# Patient Record
Sex: FEMALE | Race: WHITE | URBAN - NONMETROPOLITAN AREA
[De-identification: names, ages, dates, MRNs, and addresses within clinical notes are randomized per-mention and may not be internally consistent; named-entity substitution may affect disease eponyms.]

---

## 2017-04-20 ENCOUNTER — OFFICE VISIT (OUTPATIENT)
Dept: URGENT CARE | Age: 27
End: 2017-04-20

## 2017-04-20 VITALS
HEIGHT: 67 IN | HEART RATE: 60 BPM | RESPIRATION RATE: 18 BRPM | DIASTOLIC BLOOD PRESSURE: 83 MMHG | WEIGHT: 163 LBS | BODY MASS INDEX: 25.58 KG/M2 | SYSTOLIC BLOOD PRESSURE: 118 MMHG | TEMPERATURE: 97.7 F | OXYGEN SATURATION: 99 %

## 2017-04-20 DIAGNOSIS — H66.92 LEFT OTITIS MEDIA, UNSPECIFIED CHRONICITY, UNSPECIFIED OTITIS MEDIA TYPE: Primary | ICD-10-CM

## 2017-04-20 PROCEDURE — 99213 OFFICE O/P EST LOW 20 MIN: CPT | Performed by: NURSE PRACTITIONER

## 2017-04-20 RX ORDER — AZITHROMYCIN 250 MG/1
TABLET, FILM COATED ORAL
Qty: 1 PACKET | Refills: 0 | Status: SHIPPED | OUTPATIENT
Start: 2017-04-20 | End: 2017-04-30

## 2017-04-20 ASSESSMENT — ENCOUNTER SYMPTOMS
RESPIRATORY NEGATIVE: 1
EYES NEGATIVE: 1
SHORTNESS OF BREATH: 0
TROUBLE SWALLOWING: 0
WHEEZING: 0
EYE REDNESS: 0
SINUS PRESSURE: 0
SORE THROAT: 0
ALLERGIC/IMMUNOLOGIC NEGATIVE: 1

## 2019-07-02 ENCOUNTER — HOSPITAL ENCOUNTER (OUTPATIENT)
Dept: GENERAL RADIOLOGY | Facility: HOSPITAL | Age: 29
Discharge: HOME | End: 2019-07-02
Admitting: PEDIATRICS

## 2019-07-02 ENCOUNTER — TRANSCRIBE ORDERS (OUTPATIENT)
Dept: LAB | Facility: HOSPITAL | Age: 29
End: 2019-07-02

## 2019-07-02 DIAGNOSIS — R07.9 CHEST PAIN, UNSPECIFIED TYPE: Primary | ICD-10-CM

## 2019-07-02 DIAGNOSIS — R07.9 CHEST PAIN, UNSPECIFIED TYPE: ICD-10-CM

## 2019-07-02 PROCEDURE — 71046 X-RAY EXAM CHEST 2 VIEWS: CPT

## 2020-07-09 ENCOUNTER — OFFICE VISIT (OUTPATIENT)
Dept: OBSTETRICS AND GYNECOLOGY | Facility: CLINIC | Age: 30
End: 2020-07-09

## 2020-07-09 VITALS
HEIGHT: 67 IN | WEIGHT: 186 LBS | SYSTOLIC BLOOD PRESSURE: 114 MMHG | BODY MASS INDEX: 29.19 KG/M2 | DIASTOLIC BLOOD PRESSURE: 70 MMHG

## 2020-07-09 DIAGNOSIS — N91.2 AMENORRHEA: ICD-10-CM

## 2020-07-09 DIAGNOSIS — N92.6 MISSED PERIOD: Primary | ICD-10-CM

## 2020-07-09 LAB
B-HCG UR QL: NEGATIVE
INTERNAL NEGATIVE CONTROL: NEGATIVE
INTERNAL POSITIVE CONTROL: POSITIVE
Lab: NORMAL

## 2020-07-09 PROCEDURE — 99213 OFFICE O/P EST LOW 20 MIN: CPT | Performed by: NURSE PRACTITIONER

## 2020-07-09 PROCEDURE — 81025 URINE PREGNANCY TEST: CPT | Performed by: NURSE PRACTITIONER

## 2020-07-09 NOTE — PROGRESS NOTES
Dominique Tee is a 30 y.o.      Chief Complaint   Patient presents with   • Possible Pregnancy     Pt states she missed her period last month has taken home pregnancy test they are neg.  Pt states she is under alot of stress and wants to make sure everything is normal.             HPI - LMP  About 5/16 and skipped  which is unusual  For her.  She faithfully uses condoms.  She is under a lot of stress. She has no galactorrhea, she has no pelvic pain.  She did not have her usual bloating but did have some acne last month prior to the time she was to start. G1 G0. (1 elective AB).  She would like her thyroid checked levels checked as her mother and sister have thyroid problems.      The following portions of the patient's history were reviewed and updated as appropriate:vital signs, allergies, current medications, past family history, past medical history, past social history, past surgical history and problem list.      Current Outpatient Medications:   •  loratadine-pseudoephedrine (Claritin-D 12 Hour) 5-120 MG per 12 hr tablet, Take  by mouth., Disp: , Rfl:     Review of Systems   Constitutional: Negative for activity change and unexpected weight loss.   HENT: Negative for congestion.         Allergies   Cardiovascular: Negative for chest pain.   Gastrointestinal: Negative for blood in stool, constipation and diarrhea.   Endocrine: Negative for cold intolerance and heat intolerance.   Genitourinary: Positive for amenorrhea (LMP 5/6, skipped menses in  and this is unusual  for her.). Negative for dyspareunia, pelvic pain and vaginal discharge.   Musculoskeletal: Negative for arthralgias, back pain, neck pain and neck stiffness.   Skin: Negative for rash.   Neurological: Negative for dizziness and headache.   Psychiatric/Behavioral: Positive for stress (related to busy at work and buying a new home). Negative for decreased concentration, hallucinations, sleep disturbance, suicidal ideas and  "depressed mood. The patient is not nervous/anxious.      Breast ROS: no galactorrhea    Objective      /70   Ht 170.2 cm (67\")   Wt 84.4 kg (186 lb)   LMP 05/17/2020 (Approximate)   BMI 29.13 kg/m²       Physical Exam   Constitutional: She is oriented to person, place, and time. She appears well-developed and well-nourished.   Eyes: Right eye exhibits no discharge. Left eye exhibits no discharge.   Pulmonary/Chest: Effort normal.   Musculoskeletal: She exhibits no edema.   Neurological: She is alert and oriented to person, place, and time.   Psychiatric: She has a normal mood and affect. Her behavior is normal.   Nursing note and vitals reviewed.       Assessment/Plan               Diagnoses and all orders for this visit:    Missed period  LMP 5/6 faithfully using condoms, , unusual for her to miss but has been under a lot of situational stress  -     POC Pregnancy, Urine negative  -     HCG, B-subunit, Quantitative    Amenorrhea  No pelvic pain, no galactorrhea  -     TSH  -     T3, Free  -     T4  -     Thyroid Peroxidase Antibody  Quant. Pending and patient elects to wait and see if her period starts on it's own if quant. Negative as expected.  She will wait a month and if still no period will repeat PG and in negative induce menses with Provera.    Patient voiced understanding of plan of care.    Patient is aware of the dangers of vaping to her health.                      Dali Dietrich, APRN  7/9/2020           "

## 2020-07-11 LAB
HCG INTACT+B SERPL-ACNC: <0.5 MIU/ML
T3FREE SERPL-MCNC: 3.6 PG/ML (ref 2–4.4)
T4 SERPL-MCNC: 8.9 UG/DL (ref 4.5–12)
THYROPEROXIDASE AB SERPL-ACNC: <9 IU/ML (ref 0–34)
TSH SERPL DL<=0.005 MIU/L-ACNC: 3.79 UIU/ML (ref 0.27–4.2)

## 2020-09-16 ENCOUNTER — HOSPITAL ENCOUNTER (OUTPATIENT)
Dept: GENERAL RADIOLOGY | Facility: HOSPITAL | Age: 30
Discharge: HOME | End: 2020-09-16
Admitting: PEDIATRICS

## 2020-09-16 ENCOUNTER — TRANSCRIBE ORDERS (OUTPATIENT)
Dept: ADMINISTRATIVE | Facility: HOSPITAL | Age: 30
End: 2020-09-16

## 2020-09-16 DIAGNOSIS — S99.912D UNSPECIFIED INJURY OF LEFT ANKLE, SUBSEQUENT ENCOUNTER: Primary | ICD-10-CM

## 2020-09-16 PROCEDURE — 73610 X-RAY EXAM OF ANKLE: CPT

## 2021-03-22 ENCOUNTER — OFFICE VISIT (OUTPATIENT)
Dept: OBSTETRICS AND GYNECOLOGY | Facility: CLINIC | Age: 31
End: 2021-03-22

## 2021-03-22 VITALS
HEIGHT: 67 IN | DIASTOLIC BLOOD PRESSURE: 70 MMHG | BODY MASS INDEX: 28.33 KG/M2 | WEIGHT: 180.5 LBS | SYSTOLIC BLOOD PRESSURE: 106 MMHG | RESPIRATION RATE: 16 BRPM

## 2021-03-22 DIAGNOSIS — F32.89 OTHER DEPRESSION: ICD-10-CM

## 2021-03-22 DIAGNOSIS — N92.6 IRREGULAR PERIODS: Primary | ICD-10-CM

## 2021-03-22 PROCEDURE — 99213 OFFICE O/P EST LOW 20 MIN: CPT | Performed by: NURSE PRACTITIONER

## 2021-03-22 PROCEDURE — 81025 URINE PREGNANCY TEST: CPT | Performed by: NURSE PRACTITIONER

## 2021-03-22 RX ORDER — BUPROPION HYDROCHLORIDE 150 MG/1
TABLET ORAL
Qty: 30 TABLET | Refills: 1 | Status: SHIPPED | OUTPATIENT
Start: 2021-03-22 | End: 2021-04-19 | Stop reason: SDUPTHER

## 2021-04-09 NOTE — PROGRESS NOTES
"      Dominique Tee is a 31 y.o.      Chief Complaint   Patient presents with   • Menstrual Problem     Irregular periods.           HPI - Dominique is in today.  She is having situational stress.  She has no suicida ideation.  Her periods have been irregular.  She uses condoms for birthcontrol.      The following portions of the patient's history were reviewed and updated as appropriate:vital signs, allergies, current medications, past family history, past medical history, past social history, past surgical history and problem list.      Current Outpatient Medications:   •  loratadine-pseudoephedrine (Claritin-D 12 Hour) 5-120 MG per 12 hr tablet, Take 1 tablet by mouth As Needed., Disp: , Rfl:   •  buPROPion XL (Wellbutrin XL) 150 MG 24 hr tablet, 1 po daily, Disp: 30 tablet, Rfl: 1    Review of Systems   Constitutional: Negative for activity change and unexpected weight loss.   HENT: Negative for congestion, ear pain and nosebleeds.         Allergies   Eyes: Negative for pain and discharge.   Cardiovascular: Negative for chest pain.   Gastrointestinal: Negative for blood in stool, constipation and diarrhea.   Endocrine: Negative for cold intolerance and heat intolerance.   Genitourinary: Negative for amenorrhea, dyspareunia, frequency, pelvic pain and vaginal discharge.        Irregular period   Musculoskeletal: Negative for arthralgias, back pain, neck pain and neck stiffness.   Skin: Negative for rash.   Neurological: Negative for dizziness and headache.   Psychiatric/Behavioral: Positive for stress (related to busy at work and buying a new home). Negative for decreased concentration, hallucinations, sleep disturbance, suicidal ideas and depressed mood. The patient is not nervous/anxious.          Objective      /70 (BP Location: Left arm, Patient Position: Sitting, Cuff Size: Adult)   Resp 16   Ht 170.2 cm (67\")   Wt 81.9 kg (180 lb 8 oz)   LMP 03/15/2021 (Exact Date)   BMI 28.27 kg/m² "       Physical Exam  Vitals and nursing note reviewed. Exam conducted with a chaperone present.   Constitutional:       Appearance: Normal appearance.   HENT:      Head: Normocephalic and atraumatic.      Nose: No congestion or rhinorrhea.      Mouth/Throat:      Pharynx: No oropharyngeal exudate or posterior oropharyngeal erythema.   Pulmonary:      Effort: Pulmonary effort is normal.   Musculoskeletal:         General: No swelling or tenderness.   Skin:     General: Skin is warm and dry.   Neurological:      General: No focal deficit present.      Mental Status: She is alert and oriented to person, place, and time.   Psychiatric:         Mood and Affect: Mood normal.         Behavior: Behavior normal.          Assessment/Plan       Diagnoses and all orders for this visit:    1. Irregular periods (Primary)  -     POC Pregnancy, Urine  negative    2. Other depression  And stress that is situational.  She has no suicidal ideation.  -     buPROPion XL (Wellbutrin XL) 150 MG 24 hr tablet; 1 po daily  Dispense: 30 tablet; Refill: 1      Patient will RTO in about a month for yearly and evaluation of stress, and will see if it has improved.  She is to call me if she has any side effects from the medication.        Dali Dietrich, APRN  4/9/2021

## 2021-04-19 ENCOUNTER — OFFICE VISIT (OUTPATIENT)
Dept: OBSTETRICS AND GYNECOLOGY | Facility: CLINIC | Age: 31
End: 2021-04-19

## 2021-04-19 VITALS
HEIGHT: 67 IN | DIASTOLIC BLOOD PRESSURE: 70 MMHG | WEIGHT: 178 LBS | SYSTOLIC BLOOD PRESSURE: 112 MMHG | BODY MASS INDEX: 27.94 KG/M2

## 2021-04-19 DIAGNOSIS — F32.89 OTHER DEPRESSION: ICD-10-CM

## 2021-04-19 DIAGNOSIS — F41.9 ANXIETY: Primary | ICD-10-CM

## 2021-04-19 PROCEDURE — 99213 OFFICE O/P EST LOW 20 MIN: CPT | Performed by: NURSE PRACTITIONER

## 2021-04-19 RX ORDER — BUSPIRONE HYDROCHLORIDE 5 MG/1
TABLET ORAL
Qty: 90 TABLET | Refills: 1 | Status: SHIPPED | OUTPATIENT
Start: 2021-04-19 | End: 2021-06-22 | Stop reason: SINTOL

## 2021-04-19 RX ORDER — BUPROPION HYDROCHLORIDE 150 MG/1
TABLET ORAL
Qty: 30 TABLET | Refills: 1 | Status: SHIPPED | OUTPATIENT
Start: 2021-04-19 | End: 2021-05-23 | Stop reason: SDUPTHER

## 2021-04-19 NOTE — PROGRESS NOTES
Dominique Tee is a 31 y.o.      Chief Complaint   Patient presents with   • Follow-up     pt states that she was scheduled for yearly and started yesterday and has heavy bleeding today. pt states that she came in to discuss the wellbutrin. pt states that she likes the wellbutrin and can tell a positive change but is still having moments where she gets anxious.            HPI - Dominique is feeling much better since starting the Wellbutrin 150 XL. She states she really can tell a difference but still has some anxiety  She has a better outlook today.   Her yearly is due but she has started a heavy menses and will postpone it for a few weeks.      The following portions of the patient's history were reviewed and updated as appropriate:vital signs, allergies, current medications, past family history, past medical history, past social history, past surgical history and problem list.      Current Outpatient Medications:   •  buPROPion XL (Wellbutrin XL) 150 MG 24 hr tablet, 1 po daily, Disp: 30 tablet, Rfl: 1  •  loratadine-pseudoephedrine (Claritin-D 12 Hour) 5-120 MG per 12 hr tablet, Take 1 tablet by mouth As Needed., Disp: , Rfl:   •  busPIRone (BUSPAR) 5 MG tablet, 1 po bid x 1 week then tid thereafter, Disp: 90 tablet, Rfl: 1    Review of Systems   Constitutional: Negative for activity change and unexpected weight loss.   HENT: Negative for congestion, ear pain and nosebleeds.         Allergies   Eyes: Negative for pain and discharge.   Cardiovascular: Negative for chest pain.   Gastrointestinal: Negative for blood in stool, constipation and diarrhea.   Endocrine: Negative for cold intolerance and heat intolerance.   Genitourinary: Negative for amenorrhea, dyspareunia, frequency, pelvic pain and vaginal discharge.        Irregular period   Musculoskeletal: Negative for arthralgias, back pain, neck pain and neck stiffness.   Skin: Negative for rash.   Neurological: Negative for dizziness and  "headache.   Psychiatric/Behavioral: Positive for stress (related to busy at work and buying a new home). Negative for decreased concentration, hallucinations, sleep disturbance, suicidal ideas and depressed mood. The patient is not nervous/anxious.         Depression improved greatly and anxiety is better         Objective      /70   Ht 170.2 cm (67\")   Wt 80.7 kg (178 lb)   LMP 04/18/2021   BMI 27.88 kg/m²       Physical Exam  Vitals and nursing note reviewed. Exam conducted with a chaperone present.   Constitutional:       General: She is not in acute distress.     Appearance: She is not ill-appearing.   HENT:      Head: Normocephalic and atraumatic.      Right Ear: There is no impacted cerumen.      Left Ear: There is no impacted cerumen.      Nose: No congestion or rhinorrhea.      Mouth/Throat:      Pharynx: No oropharyngeal exudate or posterior oropharyngeal erythema.   Eyes:      General:         Right eye: No discharge.   Cardiovascular:      Rate and Rhythm: Normal rate and regular rhythm.      Heart sounds: No murmur heard.   No friction rub.   Abdominal:      General: Abdomen is flat.      Palpations: Abdomen is soft. There is no mass.   Musculoskeletal:         General: No swelling or tenderness.      Cervical back: Normal range of motion and neck supple.   Skin:     General: Skin is warm and dry.   Neurological:      General: No focal deficit present.      Mental Status: She is alert and oriented to person, place, and time.      Cranial Nerves: No cranial nerve deficit.   Psychiatric:         Mood and Affect: Mood normal.         Thought Content: Thought content normal.          Assessment/Plan     Diagnoses and all orders for this visit:    1. Anxiety (Primary)  -     busPIRone (BUSPAR) 5 MG tablet; 1 po bid x 1 week then tid thereafter  Dispense: 90 tablet; Refill: 1    2. Other depression  -     buPROPion XL (Wellbutrin XL) 150 MG 24 hr tablet; 1 po daily  Dispense: 30 tablet; Refill: " 1      Counseled re: potential side effects of  Medication and she is to consider a counselor.        Dali Dietrich, APRN  4/19/2021

## 2021-04-22 ENCOUNTER — TELEPHONE (OUTPATIENT)
Dept: OBSTETRICS AND GYNECOLOGY | Facility: CLINIC | Age: 31
End: 2021-04-22

## 2021-04-22 NOTE — TELEPHONE ENCOUNTER
Pt called wants to ween off Busar, states it gives her a dull headache & has difficulty falling asleep. Gave pt instructions to ween off. Told pt to call if she has any problems with this or if she wants to try something different. Pt voiced understanding.

## 2021-05-23 DIAGNOSIS — F32.89 OTHER DEPRESSION: ICD-10-CM

## 2021-05-26 RX ORDER — BUPROPION HYDROCHLORIDE 150 MG/1
TABLET ORAL
Qty: 30 TABLET | Refills: 2 | Status: SHIPPED | OUTPATIENT
Start: 2021-05-26 | End: 2021-06-22 | Stop reason: SDUPTHER

## 2021-06-02 ENCOUNTER — OFFICE VISIT (OUTPATIENT)
Dept: URGENT CARE | Age: 31
End: 2021-06-02
Payer: COMMERCIAL

## 2021-06-02 VITALS
DIASTOLIC BLOOD PRESSURE: 81 MMHG | SYSTOLIC BLOOD PRESSURE: 129 MMHG | HEART RATE: 72 BPM | BODY MASS INDEX: 28.12 KG/M2 | TEMPERATURE: 97.3 F | HEIGHT: 67 IN | WEIGHT: 179.2 LBS | RESPIRATION RATE: 20 BRPM | OXYGEN SATURATION: 99 %

## 2021-06-02 DIAGNOSIS — J02.9 SORE THROAT: ICD-10-CM

## 2021-06-02 DIAGNOSIS — H65.93 MEE (MIDDLE EAR EFFUSION), BILATERAL: Primary | ICD-10-CM

## 2021-06-02 DIAGNOSIS — L72.9 CYST OF SKIN: ICD-10-CM

## 2021-06-02 LAB — S PYO AG THROAT QL: NORMAL

## 2021-06-02 PROCEDURE — 87880 STREP A ASSAY W/OPTIC: CPT | Performed by: NURSE PRACTITIONER

## 2021-06-02 PROCEDURE — 99203 OFFICE O/P NEW LOW 30 MIN: CPT | Performed by: NURSE PRACTITIONER

## 2021-06-02 PROCEDURE — G8419 CALC BMI OUT NRM PARAM NOF/U: HCPCS | Performed by: NURSE PRACTITIONER

## 2021-06-02 PROCEDURE — G8427 DOCREV CUR MEDS BY ELIG CLIN: HCPCS | Performed by: NURSE PRACTITIONER

## 2021-06-02 PROCEDURE — 1036F TOBACCO NON-USER: CPT | Performed by: NURSE PRACTITIONER

## 2021-06-02 RX ORDER — BUPROPION HYDROCHLORIDE 150 MG/1
TABLET ORAL
COMMUNITY
Start: 2021-05-13

## 2021-06-02 ASSESSMENT — ENCOUNTER SYMPTOMS
DIARRHEA: 0
SHORTNESS OF BREATH: 0
VOMITING: 0
WHEEZING: 0
SORE THROAT: 1
CONSTIPATION: 0
NAUSEA: 0
CHEST TIGHTNESS: 0
COUGH: 0

## 2021-06-02 NOTE — PROGRESS NOTES
[facility]  [address] [place] [address] [address] [id number]  Dept: [phone]  Dept Fax: [phone]  Loc: [phone]    Alex Ziegler is a 36 y.o. female who presents today for her medical conditions/complaintsas noted below. Alex Ziegler is c/o of Pharyngitis, Other (patients states she feels a bump behind her left ear x 1 month), and Otalgia        HPI:     Other  This is a new (\"bump\" behind left ear) problem. Episode onset: 1 month. The problem occurs daily. The problem has been unchanged. Associated symptoms include a sore throat. Pertinent negatives include no chest pain, congestion, coughing, fatigue, fever, headaches, myalgias, nausea, neck pain, numbness, rash, vomiting or weakness. Nothing aggravates the symptoms. She has tried nothing for the symptoms. Otalgia   There is pain in the left ear. This is a new problem. Episode onset: noticed while cleaning ear out. The problem has been unchanged. There has been no fever. Associated symptoms include a sore throat. Pertinent negatives include no coughing, diarrhea, ear discharge, headaches, hearing loss, neck pain, rash or vomiting. She has tried nothing for the symptoms. Past Medical History:   Diagnosis Date    Anxiety     Depression      Past Surgical History:   Procedure Laterality Date    EYE SURGERY      LASIK      TONSILLECTOMY      WISDOM TOOTH EXTRACTION         History reviewed. No pertinent family history. Social History     Tobacco Use    Smoking status: Never Smoker    Smokeless tobacco: Never Used   Substance Use Topics    Alcohol use: No      Current Outpatient Medications   Medication Sig Dispense Refill    buPROPion (WELLBUTRIN XL) 150 MG extended release tablet       Loratadine-Pseudoephedrine (CLARITIN-D 12 HOUR PO) Take by mouth       No current facility-administered medications for this visit.      Allergies   Allergen Reactions    [facility] Maintenance   Topic Date Due    Hepatitis C screen  Never done    Varicella vaccine (1 of 2 - 2-dose childhood series) Never done    COVID-19 Vaccine (1) Never done    HIV screen  Never done    DTaP/Tdap/Td vaccine (1 - Tdap) Never done    Cervical cancer screen  Never done    Flu vaccine (Season Ended) 09/01/2021    Hepatitis A vaccine  Aged Out    Hepatitis B vaccine  Aged Out    Hib vaccine  Aged Out    Meningococcal (ACWY) vaccine  Aged Out    Pneumococcal 0-64 years Vaccine  Aged Out       Subjective:     Review of Systems   Constitutional: Negative for fatigue and fever. HENT: Positive for ear pain and sore throat. Negative for congestion, ear discharge and hearing loss. Respiratory: Negative for cough, chest tightness, shortness of breath and wheezing. Cardiovascular: Negative for chest pain and palpitations. Gastrointestinal: Negative for constipation, diarrhea, nausea and vomiting. Musculoskeletal: Negative. Negative for myalgias and neck pain. Skin: Negative. Negative for rash. Neurological: Negative for dizziness, speech difficulty, weakness, numbness and headaches. Psychiatric/Behavioral: Negative for confusion. All other systems reviewed and are negative. Objective:     Physical Exam  Vitals and nursing note reviewed. Constitutional:       General: She is not in acute distress. Appearance: Normal appearance. She is not ill-appearing or toxic-appearing. HENT:      Head: Normocephalic and atraumatic. Comments: Soft freely movable lump approx size of a pea posterior to left ear. No warmth, erythema, tenderness. No skin abnormalities. Right Ear: Ear canal and external ear normal. A middle ear effusion is present. Left Ear: Ear canal and external ear normal. A middle ear effusion is present. Mouth/Throat:      Mouth: Mucous membranes are moist.      Pharynx: Oropharynx is clear.       Comments: Normal pharynx and posterior pharynx anatomy; Version: 12.8  © 2181-7464 Healthwise, Incorporated. Care instructions adapted under license by [facility] ([facility]). If you have questions about a medical condition or this instruction, always ask your healthcare professional. [address] any warranty or liability for your use of this information.                Electronically signed by KILEY Liang CNP on 6/2/2021 at 10:31 AM

## 2021-06-02 NOTE — PATIENT INSTRUCTIONS
1. Start over the counter antihistamine such as claritin or zyrtec along with flonase nasal spray. 2. Monitor cyst as we discussed, return for any worsening symptoms such as growth, redness, warm, pain, exc. 3. Keep appt with Dr. Najda Verdugo on the 24th. Patient Education        Middle Ear Fluid: Care Instructions  Your Care Instructions     Fluid often builds up inside the ear during a cold or allergies. Usually the fluid drains away, but sometimes a small tube in the ear, called the eustachian tube, stays blocked for months. Symptoms of fluid buildup may include:  · Popping, ringing, or a feeling of fullness or pressure in the ear. · Trouble hearing. · Balance problems and dizziness. In most cases, you can treat yourself at home. Follow-up care is a key part of your treatment and safety. Be sure to make and go to all appointments, and call your doctor if you are having problems. It's also a good idea to know your test results and keep a list of the medicines you take. How can you care for yourself at home? · In most cases, the fluid clears up within a few months without treatment. You may need more tests if the fluid does not clear up after 3 months. · If your doctor prescribed antibiotics, take them as directed. Do not stop taking them just because you feel better. You need to take the full course of antibiotics. When should you call for help? Call your doctor now or seek immediate medical care if:    · You have symptoms of infection, such as:  ? Increased pain, swelling, warmth, or redness. ? Pus draining from the area. ? A fever. Watch closely for changes in your health, and be sure to contact your doctor if:    · You notice changes in hearing.     · You do not get better as expected. Where can you learn more? Go to [email or web address] and sign in to your [employer] account.  Enter M391 in the [employer] box to learn more about \"Middle Ear Fluid: Care Instructions. \"     If you do not have an account, please click on the \"Sign Up Now\" link. Current as of: December 2, 2020               Content Version: 12.8  © 2006-2021 Healthwise, Incorporated. Care instructions adapted under license by [facility] ([facility]). If you have questions about a medical condition or this instruction, always ask your healthcare professional. [address] any warranty or liability for your use of this information.

## 2021-06-14 DIAGNOSIS — F41.9 ANXIETY: ICD-10-CM

## 2021-06-16 RX ORDER — BUSPIRONE HYDROCHLORIDE 5 MG/1
TABLET ORAL
Qty: 90 TABLET | Refills: 1 | OUTPATIENT
Start: 2021-06-16

## 2021-06-22 ENCOUNTER — OFFICE VISIT (OUTPATIENT)
Dept: OBSTETRICS AND GYNECOLOGY | Facility: CLINIC | Age: 31
End: 2021-06-22

## 2021-06-22 VITALS
BODY MASS INDEX: 28.09 KG/M2 | HEIGHT: 67 IN | DIASTOLIC BLOOD PRESSURE: 68 MMHG | SYSTOLIC BLOOD PRESSURE: 126 MMHG | WEIGHT: 179 LBS

## 2021-06-22 DIAGNOSIS — Z01.419 WELL WOMAN EXAM WITH ROUTINE GYNECOLOGICAL EXAM: Primary | ICD-10-CM

## 2021-06-22 DIAGNOSIS — F41.9 ANXIETY: ICD-10-CM

## 2021-06-22 DIAGNOSIS — E66.3 OVERWEIGHT WITH BODY MASS INDEX (BMI) OF 28 TO 28.9 IN ADULT: ICD-10-CM

## 2021-06-22 DIAGNOSIS — F32.89 OTHER DEPRESSION: ICD-10-CM

## 2021-06-22 DIAGNOSIS — R01.1 HEART MURMUR: ICD-10-CM

## 2021-06-22 PROCEDURE — 99395 PREV VISIT EST AGE 18-39: CPT | Performed by: NURSE PRACTITIONER

## 2021-06-22 PROCEDURE — G0123 SCREEN CERV/VAG THIN LAYER: HCPCS | Performed by: NURSE PRACTITIONER

## 2021-06-22 RX ORDER — BUPROPION HYDROCHLORIDE 150 MG/1
TABLET ORAL
Qty: 30 TABLET | Refills: 5 | Status: SHIPPED | OUTPATIENT
Start: 2021-06-22 | End: 2021-11-15 | Stop reason: SDUPTHER

## 2021-06-22 NOTE — PROGRESS NOTES
"      Dominique Tee is a 31 y.o.      Chief Complaint   Patient presents with   • Annual Exam     She states no concerns or complaints.           HPI - Patient is in for gyn exam.  Patient is on her 5th day of menses. She is using condoms.She is feeling better with the Wellbutrin 150 ER.  She still has trouble \"turning off her mind,\" she is thinking constantly.    She is using condoms for birthcontrol.        The following portions of the patient's history were reviewed and updated as appropriate:vital signs, allergies, current medications, past family history, past medical history, past social history, past surgical history and problem list.      Current Outpatient Medications:   •  buPROPion XL (Wellbutrin XL) 150 MG 24 hr tablet, 1 po daily, Disp: 30 tablet, Rfl: 2  •  loratadine-pseudoephedrine (Claritin-D 12 Hour) 5-120 MG per 12 hr tablet, Take 1 tablet by mouth As Needed., Disp: , Rfl:   •  busPIRone (BUSPAR) 5 MG tablet, 1 po bid x 1 week then tid thereafter, Disp: 90 tablet, Rfl: 1    Review of Systems   Constitutional: Negative for activity change and unexpected weight loss.   HENT: Negative for congestion, ear pain, nosebleeds, sinus pressure and swollen glands.         Allergies   Eyes: Negative for pain and discharge.   Respiratory: Negative for choking and shortness of breath.    Cardiovascular: Negative for chest pain.   Gastrointestinal: Negative for blood in stool, constipation and diarrhea.   Endocrine: Negative for cold intolerance and heat intolerance.   Genitourinary: Negative for amenorrhea, dyspareunia, frequency, pelvic pain and vaginal discharge.        Menses 1 time per month   Musculoskeletal: Negative for arthralgias, back pain, neck pain and neck stiffness.   Skin: Negative for rash.   Neurological: Negative for dizziness and headache.   Psychiatric/Behavioral: Negative for decreased concentration, hallucinations, sleep disturbance, suicidal ideas, depressed mood and stress " "(related to busy at work and buying a new home). The patient is not nervous/anxious.         Depression improved greatly and anxiety is better       Objective      /68 (BP Location: Left arm, Patient Position: Sitting)   Ht 170.2 cm (67\")   Wt 81.2 kg (179 lb)   LMP 06/16/2021   Breastfeeding No   BMI 28.04 kg/m²       Physical Exam  Vitals and nursing note reviewed. Exam conducted with a chaperone present.   Constitutional:       Appearance: Normal appearance.   HENT:      Head: Normocephalic and atraumatic.      Nose: No congestion or rhinorrhea.      Mouth/Throat:      Pharynx: No oropharyngeal exudate or posterior oropharyngeal erythema.   Cardiovascular:      Rate and Rhythm: Normal rate.      Pulses: Normal pulses.      Comments: Murmur heard  Patient states she feels flutters and occ.she will have a sharp pain in her chest that lasts only seconds  Pulmonary:      Effort: Pulmonary effort is normal.   Chest:      Breasts:         Right: Normal. No swelling, bleeding, inverted nipple or mass.         Left: No swelling, bleeding, inverted nipple or mass.   Abdominal:      General: Abdomen is flat.      Palpations: There is no mass.      Tenderness: There is no guarding or rebound.      Hernia: There is no hernia in the left inguinal area or right inguinal area.   Genitourinary:     Exam position: Lithotomy position.      Labia:         Right: No rash.         Left: No rash.       Vagina: No signs of injury and foreign body. No vaginal discharge or erythema.      Cervix: No cervical motion tenderness.      Adnexa:         Right: No mass.          Left: No mass.        Rectum: No mass.   Lymphadenopathy:      Lower Body: No right inguinal adenopathy. No left inguinal adenopathy.   Skin:     Coloration: Skin is not jaundiced or pale.   Neurological:      General: No focal deficit present.      Mental Status: She is alert and oriented to person, place, and time.   Psychiatric:         Mood and Affect: " Mood normal.         Behavior: Behavior normal.          Assessment/Plan     Diagnoses and all orders for this visit:    1. Well woman exam with routine gynecological exam (Primary)  -     Liquid-based Pap Smear, Screening    2. Other depression  -     buPROPion XL (Wellbutrin XL) 150 MG 24 hr tablet; 1 po daily  Dispense: 30 tablet; Refill: 5    -     Ambulatory Referral to Psychiatry    3. Heart murmur  -     Ambulatory Referral to Cardiology    4. Anxiety    5. Overweight with body mass index (BMI) of 28 to 28.9 in adult    Patient is counseled re: BSE, diet, exercise, calcium and Vit D3.      Dali Dietrich, APRN  6/22/2021

## 2021-06-23 LAB
GEN CATEG CVX/VAG CYTO-IMP: NORMAL
HPV I/H RISK 4 DNA CVX QL PROBE+SIG AMP: NOT DETECTED
LAB AP CASE REPORT: NORMAL
PATH INTERP SPEC-IMP: NORMAL
STAT OF ADQ CVX/VAG CYTO-IMP: NORMAL

## 2021-06-23 PROCEDURE — 87624 HPV HI-RISK TYP POOLED RSLT: CPT | Performed by: NURSE PRACTITIONER

## 2021-09-16 ENCOUNTER — OFFICE VISIT (OUTPATIENT)
Dept: CARDIOLOGY | Facility: CLINIC | Age: 31
End: 2021-09-16

## 2021-09-16 VITALS
OXYGEN SATURATION: 99 % | SYSTOLIC BLOOD PRESSURE: 123 MMHG | HEIGHT: 67 IN | DIASTOLIC BLOOD PRESSURE: 65 MMHG | BODY MASS INDEX: 29.19 KG/M2 | HEART RATE: 66 BPM | WEIGHT: 186 LBS

## 2021-09-16 DIAGNOSIS — R01.1 HEART MURMUR: Primary | ICD-10-CM

## 2021-09-16 PROCEDURE — 99203 OFFICE O/P NEW LOW 30 MIN: CPT | Performed by: INTERNAL MEDICINE

## 2021-09-16 PROCEDURE — 93000 ELECTROCARDIOGRAM COMPLETE: CPT | Performed by: INTERNAL MEDICINE

## 2021-09-16 NOTE — PROGRESS NOTES
"    [facility] - CARDIOLOGY  New Patient Initial Outpatient Evaluation    Primary Care Physician: Dali Dietrich APRN    Subjective     Chief Complaint: Murmur.     History of Present Illness    Ms. Tee is a 30-year-old female who presents for evaluation of a murmur.     She reports that she was referred to me by her gynecologist secondary to an apparent murmur that was auscultated. In the past while the patient was in high school, she was evaluated for palpitations that were ultimately attributed to anxiety. She reports that she did wear a Holter monitor at that time and the results were unremarkable. The patient does not believe she had an echocardiogram performed at that time. She does not remember being told that she had a murmur as a child. Currently, she is not experiencing any symptoms and she is not particularly concerned regarding this finding  She denies having any difficulty breathing, swelling, chest pressure, or heaviness. She states that in 2019 she had a \"shocking\" pain in her chest, at which time she presented to the [facility] where x-rays were performed. She was told that this pain was most likely secondary to a pinched nerve.     The patient denies ever being diagnosed with anemia. She reportedly does not have heavy periods. She is unsure if she was on her period at the time of her gynecologist appointment.     She states that she quit smoking, but she does vape. She is currently taking Wellbutrin.     She is employed as a .     According to the patient, her sister has a leaky valve. She does not believe that this required any treatment.     Review of Systems   Constitutional: Negative for malaise/fatigue.   Cardiovascular: Negative for chest pain, claudication, dyspnea on exertion, leg swelling, near-syncope, orthopnea, palpitations, paroxysmal nocturnal dyspnea and syncope.   Respiratory: Negative for shortness of breath.    Hematologic/Lymphatic: Does not bruise/bleed easily. " "       Otherwise complete ROS reviewed and negative except as mentioned in the HPI.      Past Medical History:   Past Medical History:   Diagnosis Date   • Heart murmur        Past Surgical History:  Past Surgical History:   Procedure Laterality Date   • ADENOIDECTOMY     • REFRACTIVE SURGERY     • TONSILLECTOMY     • WISDOM TOOTH EXTRACTION         Family History: family history is not on file.    Social History:  reports that she has been smoking electronic cigarette. She has never used smokeless tobacco. She reports current alcohol use. She reports current drug use. Frequency: 2.00 times per week. Drug: Marijuana.    Medications:  Prior to Admission medications    Medication Sig Start Date End Date Taking? Authorizing Provider   buPROPion XL (Wellbutrin XL) 150 MG 24 hr tablet 1 po daily 6/22/21  Yes Dali Dietrich APRN   loratadine-pseudoephedrine (Claritin-D 12 Hour) 5-120 MG per 12 hr tablet Take 1 tablet by mouth As Needed.   Yes Provider, MD Wilian     Allergies:  Allergies   Allergen Reactions   • Zoloft [Sertraline Hcl] Swelling   • Amoxicillin Rash       Objective     Vital Signs: /65   Pulse 66   Ht 170.2 cm (67\")   Wt 84.4 kg (186 lb)   SpO2 99%   BMI 29.13 kg/m²     Vitals and nursing note reviewed.   Constitutional:       General: Not in acute distress.     Appearance: Not in distress.   Neck:      Vascular: No JVD or JVR. JVD normal.   Pulmonary:      Effort: Pulmonary effort is normal.      Breath sounds: Normal breath sounds.   Cardiovascular:      Normal rate. Regular rhythm.      Murmurs: There is a grade 1/6 systolic murmur. Soft systolic murmur along the upper sternal border.      No gallop. No rub.   Pulses:     Intact distal pulses.   Edema:     Peripheral edema absent.   Skin:     General: Skin is warm and dry.   Neurological:      Mental Status: Alert, oriented to person, place, and time and oriented to person, place and time.         Results Reviewed:      ECG 12 " Lead    Date/Time: 9/16/2021 12:50 PM  Performed by: Zen Jimenez MD  Authorized by: Zen Jimenez MD   Rhythm: sinus rhythm  BPM: 66    Clinical impression: normal ECG            Assessment / Plan        Problem List Items Addressed This Visit     None      Visit Diagnoses     Heart murmur    -  Primary    Relevant Orders    Adult Transthoracic Echo Complete w/ Color, Spectral and Contrast if necessary per protocol        Recommendations/Plans:    Will obtain an echocardiogram to assess for any structural abnormalities accounting for a soft murmur, though it is not concerning based upon exam.    Follow up to be determined after echocardiogram.    Transcribed from ambient dictation for Zen Jimenez MD by Roshni Kaur.  09/16/21   14:09 CDT    I Zen Jimenez MD have personally performed the services described in this document as scribed by the above individual, and it is both accurate and complete.   I have edited each component as needed.    Zen Jimenez MD  9/16/2021  21:39 CDT

## 2021-11-01 ENCOUNTER — HOSPITAL ENCOUNTER (OUTPATIENT)
Dept: CARDIOLOGY | Facility: HOSPITAL | Age: 31
Discharge: HOME | End: 2021-11-01
Admitting: INTERNAL MEDICINE

## 2021-11-01 VITALS
WEIGHT: 186 LBS | BODY MASS INDEX: 29.19 KG/M2 | SYSTOLIC BLOOD PRESSURE: 103 MMHG | HEIGHT: 67 IN | DIASTOLIC BLOOD PRESSURE: 59 MMHG

## 2021-11-01 DIAGNOSIS — R01.1 HEART MURMUR: ICD-10-CM

## 2021-11-01 PROCEDURE — 93306 TTE W/DOPPLER COMPLETE: CPT

## 2021-11-01 PROCEDURE — 93306 TTE W/DOPPLER COMPLETE: CPT | Performed by: INTERNAL MEDICINE

## 2021-11-05 LAB
BH CV ECHO MEAS - AO MAX PG (FULL): 7 MMHG
BH CV ECHO MEAS - AO MAX PG: 11 MMHG
BH CV ECHO MEAS - AO MEAN PG (FULL): 4 MMHG
BH CV ECHO MEAS - AO MEAN PG: 6 MMHG
BH CV ECHO MEAS - AO ROOT AREA (BSA CORRECTED): 1.1
BH CV ECHO MEAS - AO ROOT AREA: 3.8 CM^2
BH CV ECHO MEAS - AO ROOT DIAM: 2.2 CM
BH CV ECHO MEAS - AO V2 MAX: 166 CM/SEC
BH CV ECHO MEAS - AO V2 MEAN: 112 CM/SEC
BH CV ECHO MEAS - AO V2 VTI: 38.7 CM
BH CV ECHO MEAS - AVA(I,A): 2.2 CM^2
BH CV ECHO MEAS - AVA(I,D): 2.2 CM^2
BH CV ECHO MEAS - AVA(V,A): 2.1 CM^2
BH CV ECHO MEAS - AVA(V,D): 2.1 CM^2
BH CV ECHO MEAS - BSA(HAYCOCK): 2 M^2
BH CV ECHO MEAS - BSA: 2 M^2
BH CV ECHO MEAS - BZI_BMI: 29.1 KILOGRAMS/M^2
BH CV ECHO MEAS - BZI_METRIC_HEIGHT: 170.2 CM
BH CV ECHO MEAS - BZI_METRIC_WEIGHT: 84.4 KG
BH CV ECHO MEAS - EDV(CUBED): 109.9 ML
BH CV ECHO MEAS - EDV(MOD-SP4): 76.3 ML
BH CV ECHO MEAS - EDV(TEICH): 107 ML
BH CV ECHO MEAS - EF(CUBED): 76.9 %
BH CV ECHO MEAS - EF(MOD-SP4): 65.3 %
BH CV ECHO MEAS - EF(TEICH): 68.9 %
BH CV ECHO MEAS - ESV(CUBED): 25.4 ML
BH CV ECHO MEAS - ESV(MOD-SP4): 26.5 ML
BH CV ECHO MEAS - ESV(TEICH): 33.3 ML
BH CV ECHO MEAS - FS: 38.6 %
BH CV ECHO MEAS - IVS/LVPW: 1
BH CV ECHO MEAS - IVSD: 1 CM
BH CV ECHO MEAS - LA DIMENSION: 3.2 CM
BH CV ECHO MEAS - LA/AO: 1.5
BH CV ECHO MEAS - LAT PEAK E' VEL: 14 CM/SEC
BH CV ECHO MEAS - LV DIASTOLIC VOL/BSA (35-75): 38.9 ML/M^2
BH CV ECHO MEAS - LV MASS(C)D: 167.4 GRAMS
BH CV ECHO MEAS - LV MASS(C)DI: 85.4 GRAMS/M^2
BH CV ECHO MEAS - LV MAX PG: 4 MMHG
BH CV ECHO MEAS - LV MEAN PG: 2 MMHG
BH CV ECHO MEAS - LV SYSTOLIC VOL/BSA (12-30): 13.5 ML/M^2
BH CV ECHO MEAS - LV V1 MAX: 100 CM/SEC
BH CV ECHO MEAS - LV V1 MEAN: 63.7 CM/SEC
BH CV ECHO MEAS - LV V1 VTI: 24.7 CM
BH CV ECHO MEAS - LVIDD: 4.8 CM
BH CV ECHO MEAS - LVIDS: 2.9 CM
BH CV ECHO MEAS - LVLD AP4: 7.7 CM
BH CV ECHO MEAS - LVLS AP4: 6.1 CM
BH CV ECHO MEAS - LVOT AREA (M): 3.5 CM^2
BH CV ECHO MEAS - LVOT AREA: 3.5 CM^2
BH CV ECHO MEAS - LVOT DIAM: 2.1 CM
BH CV ECHO MEAS - LVPWD: 0.98 CM
BH CV ECHO MEAS - MED PEAK E' VEL: 10 CM/SEC
BH CV ECHO MEAS - MV A MAX VEL: 51.4 CM/SEC
BH CV ECHO MEAS - MV DEC TIME: 0.17 SEC
BH CV ECHO MEAS - MV E MAX VEL: 108 CM/SEC
BH CV ECHO MEAS - MV E/A: 2.1
BH CV ECHO MEAS - RAP SYSTOLE: 5 MMHG
BH CV ECHO MEAS - RVSP: 17.3 MMHG
BH CV ECHO MEAS - SI(AO): 75 ML/M^2
BH CV ECHO MEAS - SI(CUBED): 43.1 ML/M^2
BH CV ECHO MEAS - SI(LVOT): 43.6 ML/M^2
BH CV ECHO MEAS - SI(MOD-SP4): 25.4 ML/M^2
BH CV ECHO MEAS - SI(TEICH): 37.6 ML/M^2
BH CV ECHO MEAS - SV(AO): 147.1 ML
BH CV ECHO MEAS - SV(CUBED): 84.5 ML
BH CV ECHO MEAS - SV(LVOT): 85.6 ML
BH CV ECHO MEAS - SV(MOD-SP4): 49.8 ML
BH CV ECHO MEAS - SV(TEICH): 73.7 ML
BH CV ECHO MEAS - TR MAX VEL: 175 CM/SEC
BH CV ECHO MEASUREMENTS AVERAGE E/E' RATIO: 9
LEFT ATRIUM VOLUME INDEX: 23.9 ML/M2
LEFT ATRIUM VOLUME: 46.9 CM3
MAXIMAL PREDICTED HEART RATE: 191 BPM
STRESS TARGET HR: 162 BPM

## 2021-11-15 DIAGNOSIS — F32.89 OTHER DEPRESSION: ICD-10-CM

## 2021-11-15 RX ORDER — BUPROPION HYDROCHLORIDE 150 MG/1
TABLET ORAL
Qty: 30 TABLET | Refills: 5 | Status: SHIPPED | OUTPATIENT
Start: 2021-11-15 | End: 2022-03-16 | Stop reason: SDUPTHER

## 2022-03-16 DIAGNOSIS — F32.89 OTHER DEPRESSION: ICD-10-CM

## 2022-03-17 RX ORDER — BUPROPION HYDROCHLORIDE 150 MG/1
TABLET ORAL
Qty: 30 TABLET | Refills: 2 | Status: SHIPPED | OUTPATIENT
Start: 2022-03-17

## 2022-08-01 ENCOUNTER — LAB (OUTPATIENT)
Dept: LAB | Facility: HOSPITAL | Age: 32
End: 2022-08-01

## 2022-08-01 ENCOUNTER — OFFICE VISIT (OUTPATIENT)
Dept: FAMILY MEDICINE CLINIC | Facility: CLINIC | Age: 32
End: 2022-08-01

## 2022-08-01 VITALS
TEMPERATURE: 97.6 F | WEIGHT: 159.6 LBS | OXYGEN SATURATION: 100 % | DIASTOLIC BLOOD PRESSURE: 84 MMHG | HEART RATE: 99 BPM | SYSTOLIC BLOOD PRESSURE: 123 MMHG | BODY MASS INDEX: 25.05 KG/M2 | HEIGHT: 67 IN

## 2022-08-01 DIAGNOSIS — R50.9 FEVER, UNSPECIFIED FEVER CAUSE: ICD-10-CM

## 2022-08-01 DIAGNOSIS — M25.50 ARTHRALGIA, UNSPECIFIED JOINT: ICD-10-CM
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
## 2022-08-01 DIAGNOSIS — S80.862A TICK BITE OF LEFT LOWER LEG, INITIAL ENCOUNTER: ICD-10-CM

## 2022-08-01 DIAGNOSIS — R21 RASH: ICD-10-CM

## 2022-08-01 DIAGNOSIS — R19.7 DIARRHEA, UNSPECIFIED TYPE: ICD-10-CM

## 2022-08-01 DIAGNOSIS — S80.862A TICK BITE OF LEFT LOWER LEG, INITIAL ENCOUNTER: Primary | ICD-10-CM
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
## 2022-08-01 DIAGNOSIS — R51.9 ACUTE NONINTRACTABLE HEADACHE, UNSPECIFIED HEADACHE TYPE: ICD-10-CM

## 2022-08-01 LAB
ALBUMIN SERPL-MCNC: 4.7 G/DL (ref 3.5–5)
ALBUMIN/GLOB SERPL: 1.6 G/DL (ref 1.1–2.5)
ALP SERPL-CCNC: 48 U/L (ref 24–120)
ALT SERPL W P-5'-P-CCNC: 15 U/L (ref 0–35)
ANION GAP SERPL CALCULATED.3IONS-SCNC: 9 MMOL/L (ref 4–13)
AST SERPL-CCNC: 20 U/L (ref 7–45)
AUTO MIXED CELLS #: 0.4 10*3/MM3 (ref 0.1–2.6)
AUTO MIXED CELLS %: 12.8 % (ref 0.1–24)
BILIRUB SERPL-MCNC: 0.2 MG/DL (ref 0.1–1)
BILIRUB UR QL STRIP: NEGATIVE
BUN SERPL-MCNC: 5 MG/DL (ref 5–21)
BUN/CREAT SERPL: 6.8
CALCIUM SPEC-SCNC: 8.7 MG/DL (ref 8.4–10.4)
CHLORIDE SERPL-SCNC: 109 MMOL/L (ref 98–110)
CLARITY UR: CLEAR
CO2 SERPL-SCNC: 23 MMOL/L (ref 24–31)
COLOR UR: YELLOW
CREAT SERPL-MCNC: 0.74 MG/DL (ref 0.5–1.4)
CRP SERPL-MCNC: <0.3 MG/DL (ref 0–0.5)
EGFRCR SERPLBLD CKD-EPI 2021: 111.8 ML/MIN/1.73
ERYTHROCYTE [DISTWIDTH] IN BLOOD BY AUTOMATED COUNT: 13.6 % (ref 12.3–15.4)
ERYTHROCYTE [SEDIMENTATION RATE] IN BLOOD: 6 MM/HR (ref 0–20)
GLOBULIN UR ELPH-MCNC: 3 GM/DL
GLUCOSE SERPL-MCNC: 88 MG/DL (ref 70–100)
GLUCOSE UR STRIP-MCNC: NEGATIVE MG/DL
HCT VFR BLD AUTO: 41.9 % (ref 34–46.6)
HGB BLD-MCNC: 13.6 G/DL (ref 12–15.9)
HGB UR QL STRIP.AUTO: NEGATIVE
KETONES UR QL STRIP: ABNORMAL
LEUKOCYTE ESTERASE UR QL STRIP.AUTO: NEGATIVE
LYMPHOCYTES # BLD AUTO: 1 10*3/MM3 (ref 0.7–3.1)
LYMPHOCYTES NFR BLD AUTO: 32.4 % (ref 19.6–45.3)
MCH RBC QN AUTO: 27.3 PG (ref 26.6–33)
MCHC RBC AUTO-ENTMCNC: 32.5 G/DL (ref 31.5–35.7)
MCV RBC AUTO: 84 FL (ref 79–97)
NEUTROPHILS NFR BLD AUTO: 1.6 10*3/MM3 (ref 1.7–7)
NEUTROPHILS NFR BLD AUTO: 54.8 % (ref 42.7–76)
NITRITE UR QL STRIP: NEGATIVE
PH UR STRIP.AUTO: 6 [PH] (ref 5–8)
PLATELET # BLD AUTO: 167 10*3/MM3 (ref 140–450)
PMV BLD AUTO: 9.6 FL (ref 6–12)
POTASSIUM SERPL-SCNC: 4 MMOL/L (ref 3.5–5.3)
PROT SERPL-MCNC: 7.7 G/DL (ref 6.3–8.7)
PROT UR QL STRIP: NEGATIVE
RBC # BLD AUTO: 4.99 10*6/MM3 (ref 3.77–5.28)
SODIUM SERPL-SCNC: 141 MMOL/L (ref 135–145)
SP GR UR STRIP: 1.02 (ref 1–1.03)
UROBILINOGEN UR QL STRIP: ABNORMAL
WBC NRBC COR # BLD: 3 10*3/MM3 (ref 3.4–10.8)

## 2022-08-01 PROCEDURE — 86140 C-REACTIVE PROTEIN: CPT

## 2022-08-01 PROCEDURE — 85025 COMPLETE CBC W/AUTO DIFF WBC: CPT | Performed by: NURSE PRACTITIONER

## 2022-08-01 PROCEDURE — 86666 EHRLICHIA ANTIBODY: CPT

## 2022-08-01 PROCEDURE — 86618 LYME DISEASE ANTIBODY: CPT

## 2022-08-01 PROCEDURE — 86789 WEST NILE VIRUS ANTIBODY: CPT

## 2022-08-01 PROCEDURE — 86060 ANTISTREPTOLYSIN O TITER: CPT

## 2022-08-01 PROCEDURE — 80053 COMPREHEN METABOLIC PANEL: CPT | Performed by: NURSE PRACTITIONER

## 2022-08-01 PROCEDURE — 86747 PARVOVIRUS ANTIBODY: CPT

## 2022-08-01 PROCEDURE — 99214 OFFICE O/P EST MOD 30 MIN: CPT | Performed by: NURSE PRACTITIONER

## 2022-08-01 PROCEDURE — 85652 RBC SED RATE AUTOMATED: CPT

## 2022-08-01 PROCEDURE — 81003 URINALYSIS AUTO W/O SCOPE: CPT | Performed by: NURSE PRACTITIONER

## 2022-08-01 PROCEDURE — 86757 RICKETTSIA ANTIBODY: CPT

## 2022-08-01 PROCEDURE — 36415 COLL VENOUS BLD VENIPUNCTURE: CPT | Performed by: NURSE PRACTITIONER

## 2022-08-01 PROCEDURE — 86788 WEST NILE VIRUS AB IGM: CPT

## 2022-08-01 NOTE — PROGRESS NOTES
"  Chief Complaint  Shoulder Pain and Fever  Patient statel left shoulder pain, rash on bottom of feet,low grade fever.Zoom call with with [facility].  Subjective    History of Present Illness      Patient presents to [facility] for   Patient states shoulder pain and low grade fever, rash on bottom of feet.       Review of Systems    I have reviewed and agree with the HPI and ROS information as above.  Ana M Teagueaicha Bradley, NATA     Objective   Vital Signs:   /84 (BP Location: Right arm, Patient Position: Sitting, Cuff Size: Adult)   Pulse 99   Temp 97.6 °F (36.4 °C)   Ht 170.2 cm (67\")   Wt 72.4 kg (159 lb 9.6 oz)   SpO2 100%   BMI 25.00 kg/m²     BMI is >= 25 and <30. (Overweight) The following options were offered after discussion;: weight loss educational material (shared in after visit summary)      Physical Exam  Constitutional:       Appearance: Normal appearance. She is well-developed.   HENT:      Head: Normocephalic and atraumatic.      Right Ear: External ear normal.      Left Ear: External ear normal.      Nose: Nose normal. No nasal tenderness or congestion.      Mouth/Throat:      Lips: Pink. No lesions.      Mouth: Mucous membranes are moist. No oral lesions.      Dentition: Normal dentition.      Pharynx: Oropharynx is clear. No pharyngeal swelling, oropharyngeal exudate or posterior oropharyngeal erythema.   Eyes:      General: Lids are normal. Vision grossly intact. No scleral icterus.        Right eye: No discharge.         Left eye: No discharge.      Extraocular Movements: Extraocular movements intact.      Conjunctiva/sclera: Conjunctivae normal.      Right eye: Right conjunctiva is not injected.      Left eye: Left conjunctiva is not injected.      Pupils: Pupils are equal, round, and reactive to light.   Cardiovascular:      Rate and Rhythm: Normal rate and regular rhythm.      Heart sounds: Normal heart sounds. No murmur heard.    No " gallop.   Pulmonary:      Effort: Pulmonary effort is normal.      Breath sounds: Normal breath sounds and air entry. No wheezing, rhonchi or rales.   Musculoskeletal:         General: No tenderness or deformity. Normal range of motion.      Cervical back: Full passive range of motion without pain, normal range of motion and neck supple.      Right lower leg: No edema.      Left lower leg: No edema.      Comments: Left shoulder joint pain with movement. Decreased ROM    Bilateral hip pain. Left ankle pain.    Skin:     General: Skin is warm and dry.      Coloration: Skin is not jaundiced.      Findings: No rash.      Comments: Resolving rash of bottom of bilateral feet.    Neurological:      Mental Status: She is alert and oriented to person, place, and time.      Cranial Nerves: Cranial nerves are intact.      Sensory: Sensation is intact.      Motor: Motor function is intact.      Coordination: Coordination is intact.      Gait: Gait is intact.   Psychiatric:         Attention and Perception: Attention normal.         Mood and Affect: Mood and affect normal.         Behavior: Behavior is not hyperactive. Behavior is cooperative.         Thought Content: Thought content normal.         Judgment: Judgment normal.          JUVENAL-7:      PHQ-2 Depression Screening  Little interest or pleasure in doing things? 0-->not at all   Feeling down, depressed, or hopeless? 0-->not at all   PHQ-2 Total Score 0     PHQ-9 Depression Screening  Little interest or pleasure in doing things? 0-->not at all   Feeling down, depressed, or hopeless? 0-->not at all   Trouble falling or staying asleep, or sleeping too much?     Feeling tired or having little energy?     Poor appetite or overeating?     Feeling bad about yourself - or that you are a failure or have let yourself or your family down?     Trouble concentrating on things, such as reading the newspaper or watching television?     Moving or speaking so slowly that other people could  have noticed? Or the opposite - being so fidgety or restless that you have been moving around a lot more than usual?     Thoughts that you would be better off dead, or of hurting yourself in some way?     PHQ-9 Total Score 0   If you checked off any problems, how difficult have these problems made it for you to do your work, take care of things at home, or get along with other people?        Result Review  Data Reviewed:                   Assessment and Plan      Problem List Items Addressed This Visit    None     Visit Diagnoses     Tick bite of left lower leg, initial encounter    -  Primary    Relevant Orders    CBC & Differential (Completed)    Comprehensive Metabolic Panel (Completed)    C-reactive Protein    Urinalysis With Culture If Indicated - Urine, Clean Catch (Completed)    Sedimentation Rate (Completed)    Lyme Disease Total Antibody With Reflex to Immunoassay    Ehrlichia Antibody Panel    [facility] (IgG / M)    Fever, unspecified fever cause        Relevant Orders    CBC & Differential (Completed)    Comprehensive Metabolic Panel (Completed)    C-reactive Protein    Urinalysis With Culture If Indicated - Urine, Clean Catch (Completed)    Sedimentation Rate (Completed)    Lyme Disease Total Antibody With Reflex to Immunoassay    Ehrlichia Antibody Panel    [facility] (IgG / M)    Parvovirus B19 Antibody, IgG & IgM    Antistreptolysin O (ASO) Titer    West Nile Antibodies, IgG & IgM    Arthralgia, unspecified joint        Relevant Orders    CBC & Differential (Completed)    Comprehensive Metabolic Panel (Completed)    C-reactive Protein    Urinalysis With Culture If Indicated - Urine, Clean Catch (Completed)    Sedimentation Rate (Completed)    Lyme Disease Total Antibody With Reflex to Immunoassay    Ehrlichia Antibody Panel    [facility] (IgG / M)    Parvovirus B19 Antibody, IgG & IgM    Antistreptolysin O (ASO) Titer    West Nile Antibodies, IgG & IgM    Diarrhea, unspecified  type        Relevant Orders    Gastrointestinal Panel, PCR - Stool, Per Rectum    Ova & Parasite Examination - Stool, Per Rectum    Clostridium Difficile Toxin, PCR - Stool, Per Rectum    Acute nonintractable headache, unspecified headache type        Rash          Patient comes in today complaining of a fever that started on Friday.  The highest has been 100.6.  Also complains of several joints hurting her including her left shoulder which is the worse as well as bilateral hips and left ankle.  Also complains of a headache during this time.  Has a resolving rash on the bottom of bilateral feet.  Has also had some diarrhea.  Yesterday for 30 or 45 minutes she had numbness of 3 of her fingertips of her right hand but that has resolved.  Patient states that she had a tick bite about 1 month ago.    Will proceed with blood work as well as stool samples.  Patient declines COVID testing.  Patient also declines treatment until we get these labs back.  If she changes her mind that she can call us and we can start her on doxycycline to cover tickborne illness.  Patient to return with any continuing or worsening symptoms.        Follow Up   Return if symptoms worsen or fail to improve.  Patient was given instructions and counseling regarding her condition or for health maintenance advice. Please see specific information pulled into the AVS if appropriate.

## 2022-08-02 ENCOUNTER — LAB (OUTPATIENT)
Dept: LAB | Facility: HOSPITAL | Age: 32
End: 2022-08-02

## 2022-08-02 DIAGNOSIS — R19.7 DIARRHEA, UNSPECIFIED TYPE: ICD-10-CM

## 2022-08-02 LAB
ADV 40+41 DNA STL QL NAA+NON-PROBE: NOT DETECTED
ASO AB SERPL-ACNC: 23.2 IU/ML (ref 0–200)
ASTRO TYP 1-8 RNA STL QL NAA+NON-PROBE: NOT DETECTED
B BURGDOR IGG+IGM SER QL IA: NEGATIVE
C CAYETANENSIS DNA STL QL NAA+NON-PROBE: NOT DETECTED
C COLI+JEJ+UPSA DNA STL QL NAA+NON-PROBE: NOT DETECTED
C DIFF TOX GENS STL QL NAA+PROBE: NEGATIVE
CRYPTOSP DNA STL QL NAA+NON-PROBE: NOT DETECTED
E HISTOLYT DNA STL QL NAA+NON-PROBE: NOT DETECTED
EAEC PAA PLAS AGGR+AATA ST NAA+NON-PRB: NOT DETECTED
EC STX1+STX2 GENES STL QL NAA+NON-PROBE: NOT DETECTED
EPEC EAE GENE STL QL NAA+NON-PROBE: NOT DETECTED
ETEC LTA+ST1A+ST1B TOX ST NAA+NON-PROBE: NOT DETECTED
G LAMBLIA DNA STL QL NAA+NON-PROBE: NOT DETECTED
NOROVIRUS GI+II RNA STL QL NAA+NON-PROBE: NOT DETECTED
P SHIGELLOIDES DNA STL QL NAA+NON-PROBE: NOT DETECTED
R RICKETTSI IGG SER QL IA: NEGATIVE
R RICKETTSI IGM SER-ACNC: 0.68 INDEX (ref 0–0.89)
RVA RNA STL QL NAA+NON-PROBE: NOT DETECTED
S ENT+BONG DNA STL QL NAA+NON-PROBE: NOT DETECTED
SAPO I+II+IV+V RNA STL QL NAA+NON-PROBE: NOT DETECTED
SHIGELLA SP+EIEC IPAH ST NAA+NON-PROBE: NOT DETECTED
V CHOL+PARA+VUL DNA STL QL NAA+NON-PROBE: NOT DETECTED
V CHOLERAE DNA STL QL NAA+NON-PROBE: NOT DETECTED
Y ENTEROCOL DNA STL QL NAA+NON-PROBE: NOT DETECTED

## 2022-08-02 PROCEDURE — 87177 OVA AND PARASITES SMEARS: CPT

## 2022-08-02 PROCEDURE — 87493 C DIFF AMPLIFIED PROBE: CPT

## 2022-08-02 PROCEDURE — 87507 IADNA-DNA/RNA PROBE TQ 12-25: CPT

## 2022-08-02 PROCEDURE — 87209 SMEAR COMPLEX STAIN: CPT

## 2022-08-03 ENCOUNTER — PATIENT ROUNDING (BHMG ONLY) (OUTPATIENT)
Dept: FAMILY MEDICINE CLINIC | Facility: CLINIC | Age: 32
End: 2022-08-03

## 2022-08-03 LAB
B19V IGG SER IA-ACNC: 0.2 INDEX (ref 0–0.8)
B19V IGM SER IA-ACNC: 0.1 INDEX (ref 0–0.8)
WNV IGG SER QL IA: NEGATIVE
WNV IGM SER QL IA: NEGATIVE

## 2022-08-03 NOTE — PROGRESS NOTES
August 3, 2022    Hello, may I speak with Dominique Tee?    My name is Nikki      [facility]  [facility]  [address] [place] [address] [phone].    Before we get started may I verify your date of birth? 1990    I am calling to officially welcome you to our practice and ask about your recent visit. Is this a good time to talk? yes    Tell me about your visit with us. What things went well?  Ana M was very nice and explained everything well. I think we are going to get this all figured out.       We're always looking for ways to make our patients' experiences even better. Do you have recommendations on ways we may improve?  no    Overall were you satisfied with your first visit to our practice? yes       I appreciate you taking the time to speak with me today. Is there anything else I can do for you? no      Thank you, and have a great day.

## 2022-08-05 ENCOUNTER — TELEPHONE (OUTPATIENT)
Dept: FAMILY MEDICINE CLINIC | Facility: CLINIC | Age: 32
End: 2022-08-05

## 2022-08-05 LAB
O+P SPEC MICRO: NORMAL
O+P STL CONC: NORMAL

## 2022-08-05 NOTE — TELEPHONE ENCOUNTER
Called patient with results of WBC mildly low. Likely related to a viral illness. Other labs okay. Ehrlichia and ova and parasites are pending. See how pt is feeling. Can repeat CBC in a couple weeks. Otherwise wait for other results. She states that on one her coworkers that had originally tested negative for covid, ended up testing positive. Patient believes she also what she had. She states she is feeling better now.

## 2022-08-05 NOTE — TELEPHONE ENCOUNTER
----- Message from NATA Mckenna sent at 8/5/2022  9:33 AM CDT -----  WBC mildly low. Likely related to a viral illness. Other labs okay. Ehrlichia and ova and parasites are pending. See how pt is feeling. Can repeat CBC in a couple weeks. Otherwise wait for other results.

## 2022-08-10 ENCOUNTER — TELEPHONE (OUTPATIENT)
Dept: FAMILY MEDICINE CLINIC | Facility: CLINIC | Age: 32
End: 2022-08-10

## 2022-08-10 NOTE — TELEPHONE ENCOUNTER
----- Message from NATA Mckenna sent at 8/10/2022  7:17 AM CDT -----  No ova or parasite of stool.

## 2022-08-12 ENCOUNTER — TELEPHONE (OUTPATIENT)
Dept: FAMILY MEDICINE CLINIC | Facility: CLINIC | Age: 32
End: 2022-08-12

## 2022-08-12 LAB
A PHAGOCYTOPH IGG TITR SER IF: NEGATIVE {TITER}
A PHAGOCYTOPH IGM TITR SER IF: NEGATIVE {TITER}
E CHAFFEENSIS IGG TITR SER IF: NEGATIVE {TITER}
E CHAFFEENSIS IGM TITR SER IF: NEGATIVE {TITER}

## 2022-08-12 NOTE — TELEPHONE ENCOUNTER
Called patient and informed negative ehrlichia or no ova and parasites. VU. Patient states she is all better.

## 2022-12-18 DIAGNOSIS — F32.89 OTHER DEPRESSION: ICD-10-CM

## 2022-12-19 RX ORDER — BUPROPION HYDROCHLORIDE 150 MG/1
TABLET ORAL
Qty: 30 TABLET | Refills: 2 | OUTPATIENT
Start: 2022-12-19

## 2023-01-20 ENCOUNTER — OFFICE VISIT (OUTPATIENT)
Dept: OBSTETRICS AND GYNECOLOGY | Facility: CLINIC | Age: 33
End: 2023-01-20
Payer: COMMERCIAL

## 2023-01-20 VITALS
SYSTOLIC BLOOD PRESSURE: 138 MMHG | DIASTOLIC BLOOD PRESSURE: 88 MMHG | WEIGHT: 149 LBS | HEIGHT: 67 IN | BODY MASS INDEX: 23.39 KG/M2

## 2023-01-20 DIAGNOSIS — F32.A DEPRESSION, UNSPECIFIED DEPRESSION TYPE: Primary | ICD-10-CM

## 2023-01-20 PROCEDURE — 99214 OFFICE O/P EST MOD 30 MIN: CPT | Performed by: NURSE PRACTITIONER

## 2023-01-20 NOTE — PROGRESS NOTES
"Chief Complaint  Med Management (Pt is here wanting to discuss her Wellbutrin.  Pt has been out of this medication since around [place] and wanting to discuss some alternatives, wanting to go more natural route if possible.  Spouse and patient are wanting to try to conceive in the near future so she is wanting to go with something safe for pregnancy. Depression score 21)    Subjective          Dominique Tee presents to [facility]  History of Present Illness    Review of Systems   Constitutional: Negative for fever. Activity change: decreased since stopping Wellbutrin.   Respiratory: Negative for apnea and shortness of breath.    Cardiovascular: Negative for chest pain and palpitations.   Gastrointestinal: Negative for abdominal distention, abdominal pain, constipation, diarrhea, nausea and vomiting.   Endocrine: Negative for cold intolerance and heat intolerance.   Genitourinary: Negative for difficulty urinating, frequency, menstrual problem, pelvic pain, vaginal discharge and vaginal pain.   Neurological: Negative for headaches.   Psychiatric/Behavioral: Negative for self-injury and suicidal ideas.        Sleeping a lot, less motivated since stopping Wellbutrin    Hx of depression when a teenager, tried Zoloft but did not try any other medications until starting Wellbutrin XL approx 1 yr ago.          Objective   Vital Signs:   /88   Ht 170.2 cm (67\")   Wt 67.6 kg (149 lb)   BMI 23.34 kg/m²     Physical Exam  Vitals reviewed.   Constitutional:       Appearance: She is well-developed.   Eyes:      General:         Right eye: No discharge.         Left eye: No discharge.   Cardiovascular:      Rate and Rhythm: Normal rate and regular rhythm.   Pulmonary:      Effort: Pulmonary effort is normal.      Breath sounds: Normal breath sounds.   Skin:     General: Skin is warm.   Neurological:      Mental Status: She is alert and oriented to person, place, and time.   Psychiatric:    "      Behavior: Behavior normal.         Thought Content: Thought content normal. Thought content does not include homicidal or suicidal ideation. Thought content does not include homicidal plan.         Cognition and Memory: Cognition normal.         Judgment: Judgment normal.                      Assessment and Plan      Pt declines annual exam today r/t menstrual bleeding.   Pt opts to discuss mood and medication.     HPQ9 score 21  Pt reports she stopped Wellbutrin around [place] and has noticed it was helping especially after completing questionnaire.     Discussed options for treatment of depression including OTC, exercise, medications, and counseling.   Pt opts to begin counseling.   Discussed medication options in relation to future pregnancy.   Pt to consider restarting Wellbutrin  mg daily but wants to discuss further with S/O that was present at pt request during visit.   Will send in Wellbutrin  mg po daily if she desires restart prior to appointment in a month.   Pt to begin PNV r/t desires pregnancy.   Pt advised to begin walking for briefly at least 3-5 days a wk.   Pt advised to call with any questions or concerns.   Discussed S&S to report. Pt voiced understanding.     Diagnoses and all orders for this visit:    1. Depression, unspecified depression type (Primary)  -     Ambulatory Referral to Psychology      I spent 30 minutes caring for Dominique on this date of service. This time includes time spent by me in the following activities:preparing for the visit, obtaining and/or reviewing a separately obtained history, performing a medically appropriate examination and/or evaluation , counseling and educating the patient/family/caregiver, ordering medications, tests, or procedures and documenting information in the medical record    BMI is within normal parameters. No other follow-up for BMI required.       Follow Up   Return in about 1 month (around 2/20/2023) for Annual  physical.    Patient was given instructions and counseling regarding her condition or for health maintenance advice. Please see specific information pulled into the AVS if appropriate.

## 2023-01-20 NOTE — PATIENT INSTRUCTIONS

## 2024-06-19 ENCOUNTER — TELEPHONE (OUTPATIENT)
Dept: OBSTETRICS AND GYNECOLOGY | Age: 34
End: 2024-06-19
Payer: COMMERCIAL

## 2024-06-19 NOTE — TELEPHONE ENCOUNTER
Caller: Dominique Tee    Relationship to patient: Self    Best call back number: [phone] (home)       Patient is needing: PT STATES THAT SHE TOOK AT HOME PREGNANCY TEST AND IT CAME BACK POSITIVE - HAS ONLY TAKEN ONE. PT BLED OLD BROWN BLOOD YESTERDAY, NOTHING TODAY. 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 NO DISCHARGE. PT WOULD ALSO LIKE TO HAVE A PREGNANCY TEST RAN IN OFFICE. PT WOULD LIKE CALL, OK TO [facility]. OK TO RESPOND THROUGH [facility].     PT STATES SHE IS RH NEGATIVE.

## 2024-06-21 ENCOUNTER — LAB (OUTPATIENT)
Dept: LAB | Facility: HOSPITAL | Age: 34
End: 2024-06-21
Payer: COMMERCIAL

## 2024-06-21 ENCOUNTER — OFFICE VISIT (OUTPATIENT)
Age: 34
End: 2024-06-21
Payer: COMMERCIAL

## 2024-06-21 VITALS
SYSTOLIC BLOOD PRESSURE: 104 MMHG | DIASTOLIC BLOOD PRESSURE: 70 MMHG | HEIGHT: 67 IN | BODY MASS INDEX: 23.07 KG/M2 | WEIGHT: 147 LBS

## 2024-06-21 DIAGNOSIS — Z32.01 POSITIVE PREGNANCY TEST: Primary | ICD-10-CM

## 2024-06-21 DIAGNOSIS — O26.891 RH NEGATIVE STATUS DURING PREGNANCY IN FIRST TRIMESTER: ICD-10-CM

## 2024-06-21 DIAGNOSIS — Z67.91 RH NEGATIVE STATUS DURING PREGNANCY IN FIRST TRIMESTER: ICD-10-CM

## 2024-06-21 LAB
ABO GROUP BLD: NORMAL
B-HCG UR QL: POSITIVE
BLD GP AB SCN SERPL QL: NEGATIVE
EXPIRATION DATE: ABNORMAL
HCG INTACT+B SERPL-ACNC: NORMAL MIU/ML
INTERNAL NEGATIVE CONTROL: NEGATIVE
INTERNAL POSITIVE CONTROL: POSITIVE
Lab: ABNORMAL
PROGEST SERPL-MCNC: 13.9 NG/ML
RH BLD: NEGATIVE
T&S EXPIRATION DATE: NORMAL

## 2024-06-21 PROCEDURE — 84144 ASSAY OF PROGESTERONE: CPT | Performed by: OBSTETRICS & GYNECOLOGY

## 2024-06-21 PROCEDURE — 96372 THER/PROPH/DIAG INJ SC/IM: CPT | Performed by: OBSTETRICS & GYNECOLOGY

## 2024-06-21 PROCEDURE — 84702 CHORIONIC GONADOTROPIN TEST: CPT

## 2024-06-21 PROCEDURE — 86901 BLOOD TYPING SEROLOGIC RH(D): CPT

## 2024-06-21 PROCEDURE — 86900 BLOOD TYPING SEROLOGIC ABO: CPT

## 2024-06-21 PROCEDURE — 99213 OFFICE O/P EST LOW 20 MIN: CPT | Performed by: OBSTETRICS & GYNECOLOGY

## 2024-06-21 PROCEDURE — 81025 URINE PREGNANCY TEST: CPT | Performed by: OBSTETRICS & GYNECOLOGY

## 2024-06-21 PROCEDURE — 36415 COLL VENOUS BLD VENIPUNCTURE: CPT

## 2024-06-21 PROCEDURE — 86850 RBC ANTIBODY SCREEN: CPT

## 2024-06-21 RX ORDER — PRENATAL WITH FERROUS FUM AND FOLIC ACID 3080; 920; 120; 400; 22; 1.84; 3; 20; 10; 1; 12; 200; 27; 25; 2 [IU]/1; [IU]/1; MG/1; [IU]/1; MG/1; MG/1; MG/1; MG/1; MG/1; MG/1; UG/1; MG/1; MG/1; MG/1; MG/1
1 TABLET ORAL DAILY
Qty: 90 TABLET | Refills: 3 | Status: SHIPPED | OUTPATIENT
Start: 2024-06-21

## 2024-06-25 NOTE — PROGRESS NOTES
"Chief Complaint  Possible Pregnancy (Patient here today for +HPT 6/19/24. Patient reports brown discharge on 6/19/24.)    Subjective        Dominique Tee presents to [facility] to confirm pregnancy. Pt with LMP 5/13. Pt with positive HPT on 6/19.  Reports brown spotting on that day but is not no longer having spotting. Pt with prior early miscarriage and was told she was Rh negative at that time. We have not seen pt in the past and have no record of blood type.  Pt taking daily PNV.  This was a planned pregnancy.  No medical problems.   History of Present Illness    Objective   Vital Signs:  /70   Ht 170.2 cm (67.01\")   Wt 66.7 kg (147 lb)   BMI 23.02 kg/m²   Estimated body mass index is 23.02 kg/m² as calculated from the following:    Height as of this encounter: 170.2 cm (67.01\").    Weight as of this encounter: 66.7 kg (147 lb).       BMI is within normal parameters. No other follow-up for BMI required.      Physical Exam   deferred  Result Review :                     Assessment and Plan     Diagnoses and all orders for this visit:    1. Positive pregnancy test (Primary)  -     POC Pregnancy, Urine  -     HCG, B-subunit, Quantitative  -     Cancel: ABO / Rh  -     Cancel: Antibody Screen  -     Progesterone  Confirm pregnancy with BHCG.  ABO and antibody screen ordered. Pt to come back for rhogam due to spotting if RH negative.  RTC 1-2 weeks for new ob to confirm viability.    2. Rh negative status during pregnancy in first trimester  -     Rhogam Immune Globulin Immunization    Other orders  -     Prenatal Vit-Fe Fumarate-FA (Prenatal 27-1) 27-1 MG tablet tablet; Take 1 tablet by mouth Daily.  Dispense: 90 tablet; Refill: 3             Follow Up     Return in about 2 weeks (around 7/5/2024) for new ob.  Patient was given instructions and counseling regarding her condition or for health maintenance advice. Please see specific information pulled into the AVS if appropriate. "

## 2024-07-01 ENCOUNTER — INITIAL PRENATAL (OUTPATIENT)
Age: 34
End: 2024-07-01
Payer: COMMERCIAL

## 2024-07-01 VITALS — SYSTOLIC BLOOD PRESSURE: 122 MMHG | DIASTOLIC BLOOD PRESSURE: 70 MMHG | BODY MASS INDEX: 22.86 KG/M2 | WEIGHT: 146 LBS

## 2024-07-01 DIAGNOSIS — Z3A.01 7 WEEKS GESTATION OF PREGNANCY: Primary | ICD-10-CM

## 2024-07-01 DIAGNOSIS — Z12.4 SCREENING FOR CERVICAL CANCER: ICD-10-CM

## 2024-07-01 DIAGNOSIS — Z34.81 MULTIGRAVIDA IN FIRST TRIMESTER: ICD-10-CM

## 2024-07-01 PROCEDURE — 87624 HPV HI-RISK TYP POOLED RSLT: CPT

## 2024-07-01 PROCEDURE — 0501F PRENATAL FLOW SHEET: CPT

## 2024-07-01 PROCEDURE — G0123 SCREEN CERV/VAG THIN LAYER: HCPCS

## 2024-07-01 NOTE — PROGRESS NOTES
34-year old patient arrived to initiate prenatal care.     HPI: . Patient's last menstrual period was 2024 (exact date).  Pregnancy was a surprise, they had been kind of trying but not tracking. Pre-pregnancy weight of 146.    Previous prenatal history significant for: SAB x1  History significant for: depression/anxiety    The following portion of the patient's history were reviewed and updated as needed: allergies, current medications, past family history, past medical history, social history, surgical history, and problem list.    ROS: All systems reviewed and are negative with exception of the following: mild cramping, nausea, fatigue, amenorrhea      US ordered today, reviewed and shows IUP of 7w5d gestation and Estimated Date of Delivery: 25    Pap Smear: today    Exam:  Wt: 145 lb for TWG of Not found., B/P 122/70, FHTs 149   General Appearance:  healthy-appearing . Appropriate mood and behavior.  HEENT:  Neck supple, no thyroidmegaly.  Cardiorespiratory: HR str and reg. No murmur. Lungs clear. Resp even and unlabored.  Abd: Soft, nontender. No CVA tenderness.   Ext: Calves non-tender. No cyanosis or edema.    Diagnoses and all orders for this visit:    1. 7 weeks gestation of pregnancy (Primary)  Reviewed information in new OB packet, including OTC medications for use during pregnancy, first trimester of pregnancy and discomforts, regular OB routine, ffDNA/chromosomal risk and maternal carrier screening testing.  Advised to maintain regular activity and/or exercise. Discussed bleeding and pelvic pain warnings and other signs to report. Discussed and ordered initial prenatal labs today. First Trimester of Pregnancy video & morning sickness info included in AVS.     2. Multigravida in first trimester  -     ABO / Rh  -     Ambulatory Referral to [facility]/Perinatology  -     Antibody Screen  -     CBC & Differential  -     Chlamydia trachomatis, Neisseria gonorrhoeae, PCR w/ confirmation -  Urine, Urine, Clean Catch  -     ToxASSURE Select 13 (MW) - Urine, Clean Catch  -     HCV Antibody Rfx To Qnt PCR  -     Hepatitis B Surface Antigen  -     HIV-1 / O / 2 Ag / Antibody  -     RPR, Rfx Qn RPR / Confirm TP  -     Rubella Antibody, IgG  -     Urine Culture - , Urine, Clean Catch  -     Varicella Zoster Antibody, IgG    3. Screening for cervical cancer  -     Liquid-based Pap Smear, Screening      Return to the office in 4 weeks for routine follow up and as needed with concerns.    This note has been signed electronically.   Bobbi Pritchard CNM APRN

## 2024-07-02 NOTE — ADDENDUM NOTE
Addended by: RAJ ESCOBEDO on: 7/2/2024 10:28 AM     Modules accepted: Orders     CHIEF COMPLAINT:  Claus Viveros is here today for Subsequent Annual Medicare Wellness Visit.    Medication verified and med list updated  Denies Latex allergy or symptoms of Latex sensitivity.    Refills needed today?  No          Patient would like communication of their results via:   Home Phone: [phone] (home)  Okay to leave a message containing results? Yes     CHOLESTEROL (mg/dL)   Date Value   06/15/2010 272 (H)     HDL (mg/dL)   Date Value   06/15/2010 57     No components found for: \"CHOLHDLRATIO\"  TRIGLYCERIDE (mg/dL)   Date Value   06/15/2010 261 (H)     CALCULATED LDL (mg/dL)   Date Value   06/15/2010 163 (H)      Glucose (mg/dL)   Date Value   04/12/2023 84     PSA, Total (ng/mL)   Date Value   05/08/2019 2.42         Health Maintenance Due   Topic Date Due    Pneumococcal Vaccine 65+ (1 of 2 - PCV) Never done    DTaP/Tdap/Td Vaccine (1 - Tdap) Never done    Shingles Vaccine (1 of 2) Never done    Abdominal Aortic Aneurysm (AAA) Screening  Never done    COVID-19 Vaccine (3 - Moderna risk series) 05/26/2021    Influenza Vaccine (1) Never done    Colorectal Cancer Screen-  10/07/2023    Medicare Advantage- Medicare Wellness Visit  01/01/2024    Depression Screening  01/10/2024       Patient is due for topics as listed above but is not proceeding with Immunization(s) COVID-19, Influenza, and Pneumococcal and Colorectal Cancer Screening: Colonoscopy, iFOBT, Cologuard, and Sigmoidoscopy at this time.

## 2024-07-03 LAB
GEN CATEG CVX/VAG CYTO-IMP: NORMAL
HPV I/H RISK 4 DNA CVX QL PROBE+SIG AMP: NOT DETECTED
LAB AP CASE REPORT: NORMAL
LAB AP GYN ADDITIONAL INFORMATION: NORMAL
Lab: NORMAL
PATH INTERP SPEC-IMP: NORMAL
STAT OF ADQ CVX/VAG CYTO-IMP: NORMAL

## 2024-07-13 DIAGNOSIS — O23.41 URINARY TRACT INFECTION IN MOTHER DURING FIRST TRIMESTER OF PREGNANCY: Primary | ICD-10-CM

## 2024-07-13 LAB
ABO GROUP BLD: NORMAL
BACTERIA UR CULT: ABNORMAL
BACTERIA UR CULT: ABNORMAL
BASOPHILS # BLD AUTO: 0 X10E3/UL (ref 0–0.2)
BASOPHILS NFR BLD AUTO: 0 %
BLD GP AB SCN SERPL QL: NORMAL
BLD GP AB SCN SERPL QL: POSITIVE
BLOOD GROUP ANTIBODIES SERPL: ABNORMAL
C TRACH RRNA SPEC QL NAA+PROBE: NEGATIVE
DRUGS UR: NORMAL
EOSINOPHIL # BLD AUTO: 0.1 X10E3/UL (ref 0–0.4)
EOSINOPHIL NFR BLD AUTO: 2 %
ERYTHROCYTE [DISTWIDTH] IN BLOOD BY AUTOMATED COUNT: 12.7 % (ref 11.7–15.4)
HBV SURFACE AG SERPL QL IA: NEGATIVE
HCT VFR BLD AUTO: 38.4 % (ref 34–46.6)
HCV AB SERPL QL IA: NORMAL
HCV IGG SERPL QL IA: NON REACTIVE
HGB BLD-MCNC: 12.7 G/DL (ref 11.1–15.9)
HIV 1+2 AB+HIV1 P24 AG SERPL QL IA: NON REACTIVE
IMM GRANULOCYTES # BLD AUTO: 0 X10E3/UL (ref 0–0.1)
IMM GRANULOCYTES NFR BLD AUTO: 0 %
LYMPHOCYTES # BLD AUTO: 2.2 X10E3/UL (ref 0.7–3.1)
LYMPHOCYTES NFR BLD AUTO: 23 %
MCH RBC QN AUTO: 28.3 PG (ref 26.6–33)
MCHC RBC AUTO-ENTMCNC: 33.1 G/DL (ref 31.5–35.7)
MCV RBC AUTO: 86 FL (ref 79–97)
MONOCYTES # BLD AUTO: 0.6 X10E3/UL (ref 0.1–0.9)
MONOCYTES NFR BLD AUTO: 7 %
N GONORRHOEA RRNA SPEC QL NAA+PROBE: NEGATIVE
NEUTROPHILS # BLD AUTO: 6.5 X10E3/UL (ref 1.4–7)
NEUTROPHILS NFR BLD AUTO: 68 %
OTHER ANTIBIOTIC SUSC ISLT: ABNORMAL
PLATELET # BLD AUTO: 243 X10E3/UL (ref 150–450)
RBC # BLD AUTO: 4.48 X10E6/UL (ref 3.77–5.28)
RH BLD: NEGATIVE
RPR SER QL: NON REACTIVE
RUBV IGG SERPL IA-ACNC: 2.56 INDEX
VZV IGG SER IA-ACNC: 2220 INDEX
WBC # BLD AUTO: 9.4 X10E3/UL (ref 3.4–10.8)
XXX BLOOD GROUP AB TITR SERPL AHG: ABNORMAL {TITER}

## 2024-07-13 RX ORDER — CEFPODOXIME PROXETIL 100 MG/1
100 TABLET, FILM COATED ORAL EVERY 12 HOURS
Qty: 14 TABLET | Refills: 0 | Status: SHIPPED | OUTPATIENT
Start: 2024-07-13

## 2024-07-30 ENCOUNTER — ROUTINE PRENATAL (OUTPATIENT)
Age: 34
End: 2024-07-30
Payer: COMMERCIAL

## 2024-07-30 VITALS — BODY MASS INDEX: 23.83 KG/M2 | SYSTOLIC BLOOD PRESSURE: 110 MMHG | DIASTOLIC BLOOD PRESSURE: 72 MMHG | WEIGHT: 152.2 LBS

## 2024-07-30 DIAGNOSIS — N94.9 ROUND LIGAMENT PAIN: ICD-10-CM

## 2024-07-30 DIAGNOSIS — Z3A.11 11 WEEKS GESTATION OF PREGNANCY: Primary | ICD-10-CM

## 2024-07-30 LAB
GLUCOSE UR STRIP-MCNC: NEGATIVE MG/DL
PROT UR STRIP-MCNC: NEGATIVE MG/DL

## 2024-07-30 PROCEDURE — 0502F SUBSEQUENT PRENATAL CARE: CPT | Performed by: OBSTETRICS & GYNECOLOGY

## 2024-07-30 NOTE — PROGRESS NOTES
Having round ligament pain. Reassurance given. Denies VB, LOF, ctx.     /72   Wt 69 kg (152 lb 3.2 oz)   LMP 05/13/2024 (Exact Date)   BMI 23.83 kg/m²    FHTs 166  Urine protein and glucose negative    Diagnoses and all orders for this visit:    1. 11 weeks gestation of pregnancy (Primary)  -     POC Urinalysis Dipstick  -     Cancel: [facility] Panorama Prenatal Test: Chromosomes 13, 18, 21, X & Y: Triploidy 22Q.11.2 Deletion - Blood,  -     Cooper Horizon 14 (Pan-Ethnic Standard) - Blood,  -     [facility] Panorama Prenatal Test: Chromosomes 13, 18, 21, X & Y: Triploidy 22Q.11.2 Deletion - Blood,  IUP at 11+ weeks gestation, doing well. RTC 4 weeks with sneak peak. NIPS today with carrier screening.   2. Round ligament pain  Reassurance given.

## 2024-08-07 LAB
Lab: ABNORMAL
Lab: POSITIVE
NTRA ALPHA-THALASSEMIA: NEGATIVE
NTRA BETA-HEMOGLOBINOPATHIES: NEGATIVE
NTRA CANAVAN DISEASE: NEGATIVE
NTRA CYSTIC FIBROSIS: NEGATIVE
NTRA DUCHENNE/BECKER MUSCULAR DYSTROPHY: NEGATIVE
NTRA FAMILIAL DYSAUTONOMIA: NEGATIVE
NTRA FRAGILE X SYNDROME: POSITIVE
NTRA GALACTOSEMIA: NEGATIVE
NTRA GAUCHER DISEASE: NEGATIVE
NTRA MEDIUM CHAIN ACYL-COA DEHYDROGENASE DEFICIENCY: NEGATIVE
NTRA POLYCYSTIC KIDNEY DISEASE, AUTOSOMAL RECESSIVE: NEGATIVE
NTRA SMITH-LEMLI-OPITZ SYNDROME: NEGATIVE
NTRA SPINAL MUSCULAR ATROPHY: NEGATIVE
NTRA TAY-SACHS DISEASE: NEGATIVE

## 2024-08-26 ENCOUNTER — ROUTINE PRENATAL (OUTPATIENT)
Age: 34
End: 2024-08-26
Payer: COMMERCIAL

## 2024-08-26 VITALS — DIASTOLIC BLOOD PRESSURE: 70 MMHG | SYSTOLIC BLOOD PRESSURE: 122 MMHG | BODY MASS INDEX: 24.98 KG/M2 | WEIGHT: 159.5 LBS

## 2024-08-26 DIAGNOSIS — Z34.02 PRIMIGRAVIDA, SECOND TRIMESTER: ICD-10-CM

## 2024-08-26 DIAGNOSIS — G44.209 TENSION HEADACHE: ICD-10-CM

## 2024-08-26 DIAGNOSIS — Z3A.15 15 WEEKS GESTATION OF PREGNANCY: Primary | ICD-10-CM

## 2024-08-26 LAB
GLUCOSE UR STRIP-MCNC: NEGATIVE MG/DL
PROT UR STRIP-MCNC: NEGATIVE MG/DL

## 2024-08-26 PROCEDURE — 0502F SUBSEQUENT PRENATAL CARE: CPT

## 2024-08-26 NOTE — PROGRESS NOTES
Reason for visit: Routine OB visit at 15w5d. JALEESA 2025, by Ultrasound    CC:  Has been having some headaches. Denies VB, LOF, pelvic pain, or cramping.     ROS: All systems reviewed and are negative with exception of the following: headaches, amenorrhea    Wt 159 lb for a TWG of 6.124 kg (13 lb 8 oz), /70, FHTs 157  Urine today and reviewed: neg glucose, neg protein    Anatomy Scan: scheduled for 24 at 9:30    Exam:  General Appearance:  Healthy appearing . Normal mood and behavior.  HEENT: NCAT, EOMI  HR str and reg. Lungs clear. Resp even and unlabored.  Abdomen is soft and nontender. No CVA tenderness. Uterus is consistent with EGA  Ext: No edema, nontender, no trauma, or cyanosis.    Impression  Diagnoses and all orders for this visit:    1. 15 weeks gestation of pregnancy (Primary)  Discussed second trimester of pregnancy, including discomforts and measures of comfort, pelvic pain/cramping warnings, bleeding warnings, and signs and symptoms to report. Discussed and encouraged to come to the hospital or call for vaginal bleeding, suspected leaking of fluid, pelvic pain/cramping, or any other concerns. Second trimester of pregnancy video and Round Ligament Pain education included in the AVS.   -     POC Urinalysis Dipstick    2. Primigravida, second trimester    3. Tension headache  Headache relief measures:  Avoiding headache triggers  Tylenol 1000 mg every 6 hours as needed  Magnesium 400 mg at bedtime every night  Epsom salt baths for relaxation  Peppermint oil diluted with coconut or olive oil to the back of the neck  Ice or heat to the back of the neck  Meditation  Acupressure points in the hands  Yoga  Other therapies that may be helpful for headaches:   Massage  Chiropractic care  Acupuncture         Follow up in 4 weeks for routine visit with me.    This note has been signed electronically.  NATA Haynes, JALIL

## 2024-08-26 NOTE — PATIENT INSTRUCTIONS
Headache relief measures:  Avoiding headache triggers  Tylenol 1000 mg every 6 hours as needed  Magnesium 400 mg at bedtime every night  Epsom salt baths for relaxation  Peppermint oil diluted with coconut or olive oil to the back of the neck  Ice or heat to the back of the neck  Meditation  Acupressure points in the hands  Yoga  Other therapies that may be helpful for migraines:   Massage  Chiropractic care  Acupuncture

## 2024-08-27 ENCOUNTER — TELEPHONE (OUTPATIENT)
Age: 34
End: 2024-08-27
Payer: COMMERCIAL

## 2024-08-27 NOTE — TELEPHONE ENCOUNTER
Pt would like to know what kind of magnesium she needs to take for headache prevention, she said this was discussed yesterday.

## 2024-09-23 ENCOUNTER — ROUTINE PRENATAL (OUTPATIENT)
Age: 34
End: 2024-09-23
Payer: COMMERCIAL

## 2024-09-23 VITALS — WEIGHT: 167 LBS | BODY MASS INDEX: 26.15 KG/M2 | DIASTOLIC BLOOD PRESSURE: 90 MMHG | SYSTOLIC BLOOD PRESSURE: 124 MMHG

## 2024-09-23 DIAGNOSIS — R42 DIZZY SPELLS: ICD-10-CM

## 2024-09-23 DIAGNOSIS — Z3A.19 19 WEEKS GESTATION OF PREGNANCY: Primary | ICD-10-CM

## 2024-09-23 DIAGNOSIS — Z34.02 PRIMIGRAVIDA, SECOND TRIMESTER: ICD-10-CM

## 2024-09-23 PROCEDURE — 0502F SUBSEQUENT PRENATAL CARE: CPT

## 2024-09-24 LAB
BASOPHILS # BLD AUTO: 0 X10E3/UL (ref 0–0.2)
BASOPHILS NFR BLD AUTO: 0 %
EOSINOPHIL # BLD AUTO: 0.1 X10E3/UL (ref 0–0.4)
EOSINOPHIL NFR BLD AUTO: 1 %
ERYTHROCYTE [DISTWIDTH] IN BLOOD BY AUTOMATED COUNT: 13.4 % (ref 11.7–15.4)
HCT VFR BLD AUTO: 35.5 % (ref 34–46.6)
HGB BLD-MCNC: 11.9 G/DL (ref 11.1–15.9)
IMM GRANULOCYTES # BLD AUTO: 0.1 X10E3/UL (ref 0–0.1)
IMM GRANULOCYTES NFR BLD AUTO: 1 %
LYMPHOCYTES # BLD AUTO: 2.1 X10E3/UL (ref 0.7–3.1)
LYMPHOCYTES NFR BLD AUTO: 18 %
MCH RBC QN AUTO: 30.1 PG (ref 26.6–33)
MCHC RBC AUTO-ENTMCNC: 33.5 G/DL (ref 31.5–35.7)
MCV RBC AUTO: 90 FL (ref 79–97)
MONOCYTES # BLD AUTO: 0.6 X10E3/UL (ref 0.1–0.9)
MONOCYTES NFR BLD AUTO: 6 %
NEUTROPHILS # BLD AUTO: 8.3 X10E3/UL (ref 1.4–7)
NEUTROPHILS NFR BLD AUTO: 74 %
PLATELET # BLD AUTO: 239 X10E3/UL (ref 150–450)
RBC # BLD AUTO: 3.96 X10E6/UL (ref 3.77–5.28)
WBC # BLD AUTO: 11.2 X10E3/UL (ref 3.4–10.8)

## 2024-10-20 NOTE — PROGRESS NOTES
"Reason for visit: Routine OB visit at 23w5d     CC:  Having some pain in the LUQ on occasion, feels like it comes in the evenings but without relation to meals as far as she can tell. The area will feel slightly swollen to the touch but does not appear visibly swollen. It only hurts when lying in certain positions and not in others. It is not \"severe\" but it is uncomfortable.  Endorses appropriate fetal movement. Denies VB, LOF, contractions, h/a, visual changes, and right upper quadrant pain.     ROS: All systems reviewed and are negative with exception of the following: LUQ pain, amenorrhea    Wt 80.7 kg (178 lb) lb for a TWG of 14.5 kg (32 lb), /74, FHTs 170, FH 23  Urine today and reviewed: neg glucose, neg protein    Anatomy scan: EFW 90%; LULU wnl, vertex, anterior placenta with no evidence of placenta previa.anatomy wnl.    Exam:  General Appearance:  No visualized signs of distress. Normal mood and behavior  Cardiorespiratory: HR str and reg. Lungs clear. Resp even and unlabored.  Abdomen: is soft and nontender. no CVA tenderness. Uterus is round and soft.  Ext: no edema. Calves non-tender.     Impression  Diagnoses and all orders for this visit:    1. 23 weeks gestation of pregnancy (Primary)  Discussed second trimester of pregnancy, discomforts and measures of support, fetal movement, bleeding warnings, pelvic pain and cramping/contraction warnings, and signs and symptoms to report. Discussed and encouraged to call or come to the hospital with vaginal bleeding, leaking of fluid, contractions, or any other concerns. Second Trimester of Pregnancy video and Round Ligament Pain education included in the AVS.      2. Primigravida, second trimester    3. LUQ abdominal pain  Patient to keep a log of symptoms; aggravating/alleviating factors, etc. To call or go to L&D for evaluation if it becomes severe.           Return to the office in 4 weeks for routine follow up & GTT with Dr Roberto and as needed with " concerns.    This note has been signed electronically.  Bobbi NATA Pritchard, CNM

## 2024-10-21 ENCOUNTER — ROUTINE PRENATAL (OUTPATIENT)
Age: 34
End: 2024-10-21
Payer: COMMERCIAL

## 2024-10-21 VITALS — WEIGHT: 178 LBS | BODY MASS INDEX: 27.87 KG/M2 | SYSTOLIC BLOOD PRESSURE: 122 MMHG | DIASTOLIC BLOOD PRESSURE: 74 MMHG

## 2024-10-21 DIAGNOSIS — Z3A.23 23 WEEKS GESTATION OF PREGNANCY: Primary | ICD-10-CM

## 2024-10-21 DIAGNOSIS — R10.12 LUQ ABDOMINAL PAIN: ICD-10-CM

## 2024-10-21 DIAGNOSIS — Z34.02 PRIMIGRAVIDA, SECOND TRIMESTER: ICD-10-CM

## 2024-10-21 PROBLEM — Z14.8 CARRIER OF FRAGILE X CHROMOSOME: Status: ACTIVE | Noted: 2024-09-24

## 2024-10-21 PROCEDURE — 0502F SUBSEQUENT PRENATAL CARE: CPT

## 2024-10-21 RX ORDER — MAGNESIUM OXIDE 400 MG/1
400 TABLET ORAL DAILY
COMMUNITY

## 2024-11-21 ENCOUNTER — ROUTINE PRENATAL (OUTPATIENT)
Age: 34
End: 2024-11-21
Payer: COMMERCIAL

## 2024-11-21 VITALS — SYSTOLIC BLOOD PRESSURE: 114 MMHG | DIASTOLIC BLOOD PRESSURE: 80 MMHG | BODY MASS INDEX: 29.44 KG/M2 | WEIGHT: 188 LBS

## 2024-11-21 DIAGNOSIS — Z34.83 ENCOUNTER FOR SUPERVISION OF OTHER NORMAL PREGNANCY, THIRD TRIMESTER: ICD-10-CM

## 2024-11-21 DIAGNOSIS — Z67.91 RH NEGATIVE STATUS DURING PREGNANCY IN THIRD TRIMESTER: ICD-10-CM

## 2024-11-21 DIAGNOSIS — Z3A.28 28 WEEKS GESTATION OF PREGNANCY: Primary | ICD-10-CM

## 2024-11-21 DIAGNOSIS — O26.893 RH NEGATIVE STATUS DURING PREGNANCY IN THIRD TRIMESTER: ICD-10-CM

## 2024-11-21 DIAGNOSIS — R60.0 BILATERAL LOWER EXTREMITY EDEMA: ICD-10-CM

## 2024-11-21 DIAGNOSIS — Z13.1 DIABETES MELLITUS SCREENING: ICD-10-CM

## 2024-11-21 NOTE — PROGRESS NOTES
Reason for visit: Routine OB visit at 28w0d     CC:  Continuing to have sore area at the top of the abdomen but does not think it is a concern. She is having a little swelling in her feet; it is worse on the left than on the right. Endorses appropriate fetal movement. Denies VB, LOF, contractions, h/a, visual changes, and right upper quadrant pain.     ROS: All systems reviewed and are negative with exception of the following: sore abdomen, edema, amenorrhea    Wt 85.3 kg (188 lb) lb for a TWG of 19.1 kg (42 lb), /80, FHTs 152, FH 28  Urine today and reviewed: neg glucose, tr protein    Anatomy scan: EFW 90%; anterior placenta with no evidence of placenta previa.    Exam:  General Appearance:  No visualized signs of distress. Normal mood and behavior  Cardiorespiratory: HR str and reg. Lungs clear. Resp even and unlabored.  Abdomen: is soft and nontender aside from one area at the top above the fundus. no CVA tenderness. Uterus is soft and round,.  Ext: LLE 1+, RLE tr edema. Calves non-tender.     Impression  Diagnoses and all orders for this visit:    1. 28 weeks gestation of pregnancy (Primary)  -     RPR, Rfx Qn RPR / Confirm TP  -     CBC Auto Differential    2. Encounter for supervision of other normal pregnancy, third trimester  Discussed third trimester of pregnancy, including discomforts and measures of support,  labor warnings, preeclampsia warnings, and signs and symptoms to report. Discussed glucose and hemoglobin screenings today in the clinic. Labs ordered today. Patient to notify provider and/or come to the hospital for vaginal bleeding, leaking of fluid, contractions, or other concerns. Third Trimester of Pregnancy video included in the AVS.    3. Rh negative status during pregnancy in third trimester  Rhogam information in the AVS  -     Rhogam Immune Globulin Immunization  -     Antibody Screen    4. Diabetes mellitus screening  -     Gestational Screen 1 Hr ([facility])    5. Bilateral  lower extremity edema  Discussed comfort measures to help with edema such as compression socks, epsom salt baths, propping feet in the evening.           Return to the office in 2 weeks for routine follow up with me/Dr Roberto and as needed with concerns.    This note has been signed electronically.  NATA Haynes, JALIL

## 2024-11-22 LAB
BASOPHILS # BLD AUTO: 0 X10E3/UL (ref 0–0.2)
BASOPHILS NFR BLD AUTO: 0 %
BLD GP AB SCN SERPL QL: NEGATIVE
EOSINOPHIL # BLD AUTO: 0.1 X10E3/UL (ref 0–0.4)
EOSINOPHIL NFR BLD AUTO: 1 %
ERYTHROCYTE [DISTWIDTH] IN BLOOD BY AUTOMATED COUNT: 12 % (ref 11.7–15.4)
GLUCOSE 1H P 50 G GLC PO SERPL-MCNC: 97 MG/DL (ref 70–139)
HCT VFR BLD AUTO: 35.5 % (ref 34–46.6)
HGB BLD-MCNC: 11.9 G/DL (ref 11.1–15.9)
IMM GRANULOCYTES # BLD AUTO: 0.1 X10E3/UL (ref 0–0.1)
IMM GRANULOCYTES NFR BLD AUTO: 1 %
LYMPHOCYTES # BLD AUTO: 1.8 X10E3/UL (ref 0.7–3.1)
LYMPHOCYTES NFR BLD AUTO: 17 %
MCH RBC QN AUTO: 30.1 PG (ref 26.6–33)
MCHC RBC AUTO-ENTMCNC: 33.5 G/DL (ref 31.5–35.7)
MCV RBC AUTO: 90 FL (ref 79–97)
MONOCYTES # BLD AUTO: 0.7 X10E3/UL (ref 0.1–0.9)
MONOCYTES NFR BLD AUTO: 6 %
NEUTROPHILS # BLD AUTO: 8.3 X10E3/UL (ref 1.4–7)
NEUTROPHILS NFR BLD AUTO: 75 %
PLATELET # BLD AUTO: 225 X10E3/UL (ref 150–450)
RBC # BLD AUTO: 3.96 X10E6/UL (ref 3.77–5.28)
RPR SER QL: NON REACTIVE
WBC # BLD AUTO: 11 X10E3/UL (ref 3.4–10.8)

## 2024-12-05 NOTE — PROGRESS NOTES
Reason for visit: Routine OB visit at 30w2d     CC:  Feeling pretty good, having some swelling in her hands and can no longer wear her rings. Having some swelling in her feet/ankles especially in the evenings. It does get better with rest. Endorses appropriate fetal movement. Denies VB, LOF, contractions, h/a, visual changes, and right upper quadrant pain.     ROS: All systems reviewed and are negative with exception of the following: dependent edema, amenorrhea    Wt 88 kg (194 lb) lb for a TWG of 21.8 kg (48 lb), /72, FHTs 147, FH 30  Urine today and reviewed: neg glucose, neg protein    Anatomy scan: EFW 90%; anterior placenta with no evidence of placenta previa.     Exam:  General Appearance:  No visualized signs of distress. Normal mood and behavior  Cardiorespiratory: HR str and reg. Lungs clear. Resp even and unlabored.  Abdomen: is soft and nontender. no CVA tenderness. Uterus is round and soft.  Ext: Trace edema. Calves non-tender.     Impression  Diagnoses and all orders for this visit:    1. 30 weeks gestation of pregnancy (Primary)  Discussed third trimester of pregnancy, discomforts and measures of support, fetal movement counts,  labor warnings, preeclampsia warnings, and signs and symptoms to report. Reviewed glucose tolerance test and hemoglobin results. Patient to notify provider and/or come to the hospital with vaginal bleeding, leaking of fluid, contraction, or other concerns. Third Trimester of Pregnancy video included in the AVS.     2. Encounter for supervision of other normal pregnancy, third trimester    3. Dependent edema  Patient is not having much discomfort with her edema. Discussed that she can continue to prop her feet in the evenings and that taking epsom salt baths before bed can also help.          Return to the office in 2 weeks for routine follow up with Dr. Roberto and as needed with concerns.    This note has been signed electronically.  NATA Haynes, JALIL

## 2024-12-06 ENCOUNTER — ROUTINE PRENATAL (OUTPATIENT)
Age: 34
End: 2024-12-06
Payer: COMMERCIAL

## 2024-12-06 VITALS — BODY MASS INDEX: 30.38 KG/M2 | WEIGHT: 194 LBS | DIASTOLIC BLOOD PRESSURE: 72 MMHG | SYSTOLIC BLOOD PRESSURE: 118 MMHG

## 2024-12-06 DIAGNOSIS — Z3A.30 30 WEEKS GESTATION OF PREGNANCY: Primary | ICD-10-CM

## 2024-12-06 DIAGNOSIS — Z34.83 ENCOUNTER FOR SUPERVISION OF OTHER NORMAL PREGNANCY, THIRD TRIMESTER: ICD-10-CM

## 2024-12-06 DIAGNOSIS — R60.9 DEPENDENT EDEMA: ICD-10-CM

## 2024-12-19 ENCOUNTER — ROUTINE PRENATAL (OUTPATIENT)
Age: 34
End: 2024-12-19
Payer: COMMERCIAL

## 2024-12-19 VITALS — WEIGHT: 196.4 LBS | BODY MASS INDEX: 30.75 KG/M2 | DIASTOLIC BLOOD PRESSURE: 78 MMHG | SYSTOLIC BLOOD PRESSURE: 118 MMHG

## 2024-12-19 DIAGNOSIS — G56.00 CARPAL TUNNEL SYNDROME DURING PREGNANCY: Primary | ICD-10-CM

## 2024-12-19 DIAGNOSIS — Z3A.32 32 WEEKS GESTATION OF PREGNANCY: ICD-10-CM

## 2024-12-19 DIAGNOSIS — O26.899 CARPAL TUNNEL SYNDROME DURING PREGNANCY: Primary | ICD-10-CM

## 2024-12-19 NOTE — PROGRESS NOTES
BETH visit. Pt with swelling in both hands and having pain, numbness, eva at night. 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 GFM. Denies VB, LOF, ctx. Also seems to have a very sensitive sense of smell, sometimes feels like she smells things that aren't there.     /78   Wt 89.1 kg (196 lb 6.4 oz)   LMP 05/13/2024 (Exact Date)   BMI 30.75 kg/m²    FHTs 149  Urine protein trace, urine glucose negative      Diagnoses and all orders for this visit:    1. Carpal tunnel syndrome during pregnancy (Primary)  Discussed carpal tunnel. Recommend splints eva at night.   2. 32 weeks gestation of pregnancy  IUP at 32 weeks gestation. Info on RSV given. RTC 2 weeks. PTL precautions

## 2025-01-05 NOTE — PROGRESS NOTES
Reason for visit: Routine OB visit at 34w5d     CC:  ***. Endorses *** fetal movement. Denies VB, LOF, contractions, h/a, visual changes, and right upper quadrant pain.     ROS: All systems reviewed and are negative with exception of the following: ***    Wt No Weight Documented This Encounter lb for a TWG of 22.9 kg (50 lb 6.4 oz), BP ***, FHTs ***, FH ***  Urine today and reviewed: *** glucose, *** protein    US today: EFW ***, LULU ***, DVP ***, ***, anterior placenta  Anatomy scan: EFW 90%; anterior placenta with no evidence of placenta previa.    Exam:  General Appearance:  No visualized signs of distress. Normal mood and behavior  Cardiorespiratory: HR str and reg. Lungs clear. Resp even and unlabored.  Abdomen: is soft and nontender. *** CVA tenderness. Uterus is ***.  Ext: *** edema. Calves non-tender.     Impression  Diagnoses and all orders for this visit:    1. 34 weeks gestation of pregnancy (Primary)    2. Encounter for supervision of other normal pregnancy, third trimester    3. Bilateral lower extremity edema    4. Carpal tunnel syndrome during pregnancy              Return to the office in 2 weeks for routine follow up with me and as needed with concerns.    This note has been signed electronically.  NATA Haynes, JALIL

## 2025-01-06 ENCOUNTER — ROUTINE PRENATAL (OUTPATIENT)
Age: 35
End: 2025-01-06
Payer: COMMERCIAL

## 2025-01-06 VITALS — BODY MASS INDEX: 31.63 KG/M2 | SYSTOLIC BLOOD PRESSURE: 118 MMHG | WEIGHT: 202 LBS | DIASTOLIC BLOOD PRESSURE: 82 MMHG

## 2025-01-06 DIAGNOSIS — O26.899 CARPAL TUNNEL SYNDROME DURING PREGNANCY: ICD-10-CM

## 2025-01-06 DIAGNOSIS — Z34.83 ENCOUNTER FOR SUPERVISION OF OTHER NORMAL PREGNANCY, THIRD TRIMESTER: ICD-10-CM

## 2025-01-06 DIAGNOSIS — R60.0 BILATERAL LOWER EXTREMITY EDEMA: ICD-10-CM

## 2025-01-06 DIAGNOSIS — Z3A.34 34 WEEKS GESTATION OF PREGNANCY: Primary | ICD-10-CM

## 2025-01-06 DIAGNOSIS — G56.00 CARPAL TUNNEL SYNDROME DURING PREGNANCY: ICD-10-CM

## 2025-01-13 NOTE — PROGRESS NOTES
Doing well without complaint.  Denies VB, LOF, or UCs. PTL precautions.  RTO in 2 weeks for f/u or sooner prn.    Diagnoses and all orders for this visit:    1. 34 weeks gestation of pregnancy (Primary)    2. Encounter for supervision of other normal pregnancy, third trimester    3. Bilateral lower extremity edema    4. Carpal tunnel syndrome during pregnancy

## 2025-01-20 ENCOUNTER — ROUTINE PRENATAL (OUTPATIENT)
Age: 35
End: 2025-01-20
Payer: COMMERCIAL

## 2025-01-20 VITALS — WEIGHT: 206.5 LBS | SYSTOLIC BLOOD PRESSURE: 124 MMHG | DIASTOLIC BLOOD PRESSURE: 78 MMHG | BODY MASS INDEX: 32.33 KG/M2

## 2025-01-20 DIAGNOSIS — Z3A.36 36 WEEKS GESTATION OF PREGNANCY: Primary | ICD-10-CM

## 2025-01-20 NOTE — PROGRESS NOTES
Having swelling. BP normal. Unable to void today. Gfm. Denies VB, LOF. Has BH but nothing regular.  Initially refused the GBS swab.  Discussed GBS and importance of the testing.  Pt states she will do the test next visit.     /78   Wt 93.7 kg (206 lb 8 oz)   LMP 05/13/2024 (Exact Date)   BMI 32.33 kg/m²    Fhts 132  Urine pt unable to void    Diagnoses and all orders for this visit:    1. 36 weeks gestation of pregnancy (Primary)  IUP at 36+ weeks gestation, doing well. Did not want GBS testing today but after discussing Gbs and reason for testing, pt open to get test at her next visit. RTC weekly. L&D precautions

## 2025-01-25 NOTE — PROGRESS NOTES
Reason for visit: Routine OB visit at 37w5d     CC:  Feeling pretty good but having some swelling. Endorses appropriate fetal movement. Denies VB, LOF, contractions, h/a, visual changes, and right upper quadrant pain.     ROS: All systems reviewed and are negative with exception of the following: edema, amenorrhea    Wt 96.2 kg (212 lb) lb for a TWG of 29.9 kg (66 lb), /72, FHTs 152, FH 38  Urine today and reviewed: - glucose, - protein    Anatomy scan: EFW 90%; anterior placenta with no evidence of placenta previa.    Exam:  General Appearance:  No visualized signs of distress. Normal mood and behavior  Cardiorespiratory: HR str and reg. Lungs clear. Resp even and unlabored.  Abdomen: is soft and nontender. no CVA tenderness. Uterus is round and soft.  Ext: tr edema. Calves non-tender.     Impression  Diagnoses and all orders for this visit:    1. 37 weeks gestation of pregnancy (Primary)  Discussed third trimester discomforts and measures of support, fetal movement counts, signs of labor, preeclampsia warnings, and signs and symptoms to report. Reviewed GBS done today. Discussed and encouraged to come to the hospital or call with vaginal bleeding, leaking of fluid, regular contractions, or other concerns. Signs and Symptoms of Labor and Alternative Pain Management During Labor and Delivery videos included in the AVS.  -     Chlamydia trachomatis, Neisseria gonorrhoeae, PCR w/ confirmation - Swab, Vagina  -     Strep Grp B DAPHNE + Reflex - , Vaginal/Rectum    2. Encounter for supervision of other normal pregnancy, third trimester    3. Encounter for lactation counseling  -     Misc. Devices (Breast Pump) misc; Use 1 Device As Needed (for breastfeeding).  Dispense: 1 each; Refill: 0              Return to the office in 1 week for routine follow up with me and as needed with concerns.    This note has been signed electronically.  NATA Haynes, JALIL

## 2025-01-27 ENCOUNTER — ROUTINE PRENATAL (OUTPATIENT)
Age: 35
End: 2025-01-27
Payer: COMMERCIAL

## 2025-01-27 VITALS — WEIGHT: 212 LBS | SYSTOLIC BLOOD PRESSURE: 126 MMHG | DIASTOLIC BLOOD PRESSURE: 72 MMHG | BODY MASS INDEX: 33.2 KG/M2

## 2025-01-27 DIAGNOSIS — Z3A.37 37 WEEKS GESTATION OF PREGNANCY: Primary | ICD-10-CM

## 2025-01-27 DIAGNOSIS — Z34.83 ENCOUNTER FOR SUPERVISION OF OTHER NORMAL PREGNANCY, THIRD TRIMESTER: ICD-10-CM

## 2025-01-27 RX ORDER — BREAST PUMP
1 EACH MISCELLANEOUS AS NEEDED
Qty: 1 EACH | Refills: 0 | Status: SHIPPED | OUTPATIENT
Start: 2025-01-27

## 2025-01-29 LAB
C TRACH RRNA SPEC QL NAA+PROBE: NEGATIVE
GP B STREP DNA SPEC QL NAA+PROBE: NEGATIVE
N GONORRHOEA RRNA SPEC QL NAA+PROBE: NEGATIVE

## 2025-02-03 ENCOUNTER — ROUTINE PRENATAL (OUTPATIENT)
Age: 35
End: 2025-02-03
Payer: COMMERCIAL

## 2025-02-03 VITALS — SYSTOLIC BLOOD PRESSURE: 126 MMHG | DIASTOLIC BLOOD PRESSURE: 80 MMHG | BODY MASS INDEX: 33.7 KG/M2 | WEIGHT: 215.2 LBS

## 2025-02-03 DIAGNOSIS — Z34.83 ENCOUNTER FOR SUPERVISION OF OTHER NORMAL PREGNANCY, THIRD TRIMESTER: ICD-10-CM

## 2025-02-03 DIAGNOSIS — Z3A.38 38 WEEKS GESTATION OF PREGNANCY: Primary | ICD-10-CM

## 2025-02-03 RX ORDER — PEN NEEDLE, DIABETIC 32GX 5/32"
NEEDLE, DISPOSABLE MISCELLANEOUS
Status: ON HOLD | COMMUNITY
Start: 2025-01-31

## 2025-02-03 NOTE — PROGRESS NOTES
Declines cervical exam. Denies ctx, VB, LOF.  GFM.     /80   Wt 97.6 kg (215 lb 3.2 oz)   LMP 05/13/2024 (Exact Date)   BMI 33.70 kg/m²    FHTs 150  Urine protein and glucose negative    Diagnoses and all orders for this visit:    1. 38 weeks gestation of pregnancy (Primary)  IUP at 38+ weeks gestation, doing well. RTC weekly. L&D precautions.   2. Encounter for supervision of other normal pregnancy, third trimester

## 2025-02-07 NOTE — PROGRESS NOTES
Reason for visit: Routine OB visit at 39w5d     CC:  She has some hailee choudhury and some round ligament pain. Continues to have lower extremity edema. Endorses appropriate fetal movement. Denies VB, LOF, contractions, h/a, visual changes, and right upper quadrant pain.     ROS: All systems reviewed and are negative with exception of the following: hailee choudhury, round ligament pain, amenorrhea, edema    Wt 98 kg (216 lb) lb for a TWG of 31.8 kg (70 lb), /98, FHTs 155, FH 39  Urine today and reviewed: - glucose, 2+ protein    Anatomy scan: EFW 90%; anterior placenta with no evidence of placenta previa.     Exam:  General Appearance:  No visualized signs of distress. Normal mood and behavior  Cardiorespiratory: HR str and reg. Lungs clear. Resp even and unlabored.  Abdomen: is soft and nontender. no CVA tenderness. Uterus is round and soft.  Ext: 3+ edema. Calves non-tender.     Impression  Diagnoses and all orders for this visit:    1. 39 weeks gestation of pregnancy (Primary)  Discussed third trimester discomforts and measures of support, fetal movement counts, signs of labor, preeclampsia warnings, and signs and symptoms to report. Reviewed GBS -. Discussed and encouraged to come to the hospital or call with vaginal bleeding, leaking of fluid, regular contractions, or other concerns. Signs and Symptoms of Labor and Alternative Pain Management During Labor and Delivery videos included in the AVS.    2. Encounter for supervision of other normal pregnancy, third trimester    3. Elevated blood pressure affecting pregnancy in third trimester, antepartum  -     Protein / Creatinine Ratio, Urine - Urine, Clean Catch    4. Proteinuria affecting pregnancy in third trimester  -     Protein / Creatinine Ratio, Urine - Urine, Clean Catch    5. Bilateral lower extremity edema          Sent to L&D for labs, BP's and evaluation.    Return to the office in 1 week for routine follow up with me & US and as needed with  concerns.    This note has been signed electronically.  Bobbi NTAA Pritchard, CNM

## 2025-02-10 ENCOUNTER — HOSPITAL ENCOUNTER (INPATIENT)
Facility: HOSPITAL | Age: 35
LOS: 4 days | Discharge: HOME | End: 2025-02-14
Attending: OBSTETRICS & GYNECOLOGY | Admitting: OBSTETRICS & GYNECOLOGY
Payer: COMMERCIAL

## 2025-02-10 ENCOUNTER — ROUTINE PRENATAL (OUTPATIENT)
Age: 35
End: 2025-02-10
Payer: COMMERCIAL

## 2025-02-10 VITALS — DIASTOLIC BLOOD PRESSURE: 98 MMHG | SYSTOLIC BLOOD PRESSURE: 136 MMHG | WEIGHT: 216 LBS | BODY MASS INDEX: 33.82 KG/M2

## 2025-02-10 DIAGNOSIS — O12.13 PROTEINURIA AFFECTING PREGNANCY IN THIRD TRIMESTER: ICD-10-CM

## 2025-02-10 DIAGNOSIS — Z34.83 ENCOUNTER FOR SUPERVISION OF OTHER NORMAL PREGNANCY, THIRD TRIMESTER: ICD-10-CM

## 2025-02-10 DIAGNOSIS — R60.0 BILATERAL LOWER EXTREMITY EDEMA: ICD-10-CM

## 2025-02-10 DIAGNOSIS — R52 PAIN RELATED TO VAGINAL DELIVERY: Primary | ICD-10-CM

## 2025-02-10 DIAGNOSIS — Z3A.39 39 WEEKS GESTATION OF PREGNANCY: Primary | ICD-10-CM

## 2025-02-10 DIAGNOSIS — O16.3 ELEVATED BLOOD PRESSURE AFFECTING PREGNANCY IN THIRD TRIMESTER, ANTEPARTUM: ICD-10-CM

## 2025-02-10 PROBLEM — Z34.90 PREGNANCY: Status: ACTIVE | Noted: 2025-02-10

## 2025-02-10 LAB
ABO GROUP BLD: NORMAL
ALBUMIN SERPL-MCNC: 3.8 G/DL (ref 3.5–5.2)
ALBUMIN/GLOB SERPL: 1.3 G/DL
ALP SERPL-CCNC: 149 U/L (ref 39–117)
ALT SERPL W P-5'-P-CCNC: 15 U/L (ref 1–33)
ANION GAP SERPL CALCULATED.3IONS-SCNC: 15 MMOL/L (ref 5–15)
AST SERPL-CCNC: 19 U/L (ref 1–32)
BASOPHILS # BLD AUTO: 0.03 10*3/MM3 (ref 0–0.2)
BASOPHILS NFR BLD AUTO: 0.3 % (ref 0–1.5)
BILIRUB SERPL-MCNC: 0.2 MG/DL (ref 0–1.2)
BLD GP AB SCN SERPL QL: NEGATIVE
BUN SERPL-MCNC: 9 MG/DL (ref 6–20)
BUN/CREAT SERPL: 22.5 (ref 7–25)
CALCIUM SPEC-SCNC: 9.3 MG/DL (ref 8.6–10.5)
CHLORIDE SERPL-SCNC: 103 MMOL/L (ref 98–107)
CO2 SERPL-SCNC: 17 MMOL/L (ref 22–29)
CREAT SERPL-MCNC: 0.4 MG/DL (ref 0.57–1)
DEPRECATED RDW RBC AUTO: 43 FL (ref 37–54)
EGFRCR SERPLBLD CKD-EPI 2021: 134.2 ML/MIN/1.73
EOSINOPHIL # BLD AUTO: 0.09 10*3/MM3 (ref 0–0.4)
EOSINOPHIL NFR BLD AUTO: 0.8 % (ref 0.3–6.2)
ERYTHROCYTE [DISTWIDTH] IN BLOOD BY AUTOMATED COUNT: 13.9 % (ref 12.3–15.4)
GLOBULIN UR ELPH-MCNC: 2.9 GM/DL
GLUCOSE SERPL-MCNC: 113 MG/DL (ref 65–99)
HCT VFR BLD AUTO: 37.6 % (ref 34–46.6)
HGB BLD-MCNC: 12.9 G/DL (ref 12–15.9)
IMM GRANULOCYTES # BLD AUTO: 0.1 10*3/MM3 (ref 0–0.05)
IMM GRANULOCYTES NFR BLD AUTO: 0.9 % (ref 0–0.5)
LDH SERPL-CCNC: 165 U/L (ref 135–214)
LYMPHOCYTES # BLD AUTO: 1.76 10*3/MM3 (ref 0.7–3.1)
LYMPHOCYTES NFR BLD AUTO: 15.6 % (ref 19.6–45.3)
MCH RBC QN AUTO: 29.3 PG (ref 26.6–33)
MCHC RBC AUTO-ENTMCNC: 34.3 G/DL (ref 31.5–35.7)
MCV RBC AUTO: 85.3 FL (ref 79–97)
MONOCYTES # BLD AUTO: 0.68 10*3/MM3 (ref 0.1–0.9)
MONOCYTES NFR BLD AUTO: 6 % (ref 5–12)
NEUTROPHILS NFR BLD AUTO: 76.4 % (ref 42.7–76)
NEUTROPHILS NFR BLD AUTO: 8.63 10*3/MM3 (ref 1.7–7)
NRBC BLD AUTO-RTO: 0 /100 WBC (ref 0–0.2)
PLATELET # BLD AUTO: 235 10*3/MM3 (ref 140–450)
PMV BLD AUTO: 10.1 FL (ref 6–12)
POTASSIUM SERPL-SCNC: 3.8 MMOL/L (ref 3.5–5.2)
PROT SERPL-MCNC: 6.7 G/DL (ref 6–8.5)
RBC # BLD AUTO: 4.41 10*6/MM3 (ref 3.77–5.28)
RH BLD: NEGATIVE
SODIUM SERPL-SCNC: 135 MMOL/L (ref 136–145)
T&S EXPIRATION DATE: NORMAL
TREPONEMA PALLIDUM IGG+IGM AB [PRESENCE] IN SERUM OR PLASMA BY IMMUNOASSAY: NORMAL
URATE SERPL-MCNC: 3.8 MG/DL (ref 2.4–5.7)
WBC NRBC COR # BLD AUTO: 11.29 10*3/MM3 (ref 3.4–10.8)

## 2025-02-10 PROCEDURE — 86780 TREPONEMA PALLIDUM: CPT

## 2025-02-10 PROCEDURE — 85025 COMPLETE CBC W/AUTO DIFF WBC: CPT | Performed by: OBSTETRICS & GYNECOLOGY

## 2025-02-10 PROCEDURE — 25810000003 LACTATED RINGERS PER 1000 ML

## 2025-02-10 PROCEDURE — 80053 COMPREHEN METABOLIC PANEL: CPT | Performed by: OBSTETRICS & GYNECOLOGY

## 2025-02-10 PROCEDURE — 3E0DXGC INTRODUCTION OF OTHER THERAPEUTIC SUBSTANCE INTO MOUTH AND PHARYNX, EXTERNAL APPROACH: ICD-10-PCS

## 2025-02-10 PROCEDURE — 83615 LACTATE (LD) (LDH) ENZYME: CPT | Performed by: OBSTETRICS & GYNECOLOGY

## 2025-02-10 PROCEDURE — 86901 BLOOD TYPING SEROLOGIC RH(D): CPT | Performed by: OBSTETRICS & GYNECOLOGY

## 2025-02-10 PROCEDURE — 84550 ASSAY OF BLOOD/URIC ACID: CPT | Performed by: OBSTETRICS & GYNECOLOGY

## 2025-02-10 PROCEDURE — 86850 RBC ANTIBODY SCREEN: CPT | Performed by: OBSTETRICS & GYNECOLOGY

## 2025-02-10 PROCEDURE — 86900 BLOOD TYPING SEROLOGIC ABO: CPT | Performed by: OBSTETRICS & GYNECOLOGY

## 2025-02-10 RX ORDER — ONDANSETRON 4 MG/1
4 TABLET, ORALLY DISINTEGRATING ORAL EVERY 6 HOURS PRN
OUTPATIENT
Start: 2025-02-10

## 2025-02-10 RX ORDER — MISOPROSTOL 100 MCG
25 TABLET ORAL EVERY 4 HOURS
Status: DISCONTINUED | OUTPATIENT
Start: 2025-02-10 | End: 2025-02-10

## 2025-02-10 RX ORDER — ONDANSETRON 2 MG/ML
4 INJECTION INTRAMUSCULAR; INTRAVENOUS EVERY 6 HOURS PRN
OUTPATIENT
Start: 2025-02-10

## 2025-02-10 RX ORDER — ONDANSETRON 4 MG/1
4 TABLET, ORALLY DISINTEGRATING ORAL EVERY 6 HOURS PRN
Status: DISCONTINUED | OUTPATIENT
Start: 2025-02-10 | End: 2025-02-12 | Stop reason: HOSPADM

## 2025-02-10 RX ORDER — SODIUM CHLORIDE 0.9 % (FLUSH) 0.9 %
10 SYRINGE (ML) INJECTION EVERY 12 HOURS SCHEDULED
Status: DISCONTINUED | OUTPATIENT
Start: 2025-02-10 | End: 2025-02-12 | Stop reason: HOSPADM

## 2025-02-10 RX ORDER — MORPHINE SULFATE 2 MG/ML
2 INJECTION, SOLUTION INTRAMUSCULAR; INTRAVENOUS
Status: DISCONTINUED | OUTPATIENT
Start: 2025-02-10 | End: 2025-02-12 | Stop reason: HOSPADM

## 2025-02-10 RX ORDER — SODIUM CHLORIDE 9 MG/ML
40 INJECTION, SOLUTION INTRAVENOUS AS NEEDED
Status: DISCONTINUED | OUTPATIENT
Start: 2025-02-10 | End: 2025-02-12 | Stop reason: HOSPADM

## 2025-02-10 RX ORDER — SODIUM CHLORIDE, SODIUM LACTATE, POTASSIUM CHLORIDE, CALCIUM CHLORIDE 600; 310; 30; 20 MG/100ML; MG/100ML; MG/100ML; MG/100ML
125 INJECTION, SOLUTION INTRAVENOUS CONTINUOUS
Status: DISCONTINUED | OUTPATIENT
Start: 2025-02-10 | End: 2025-02-12

## 2025-02-10 RX ORDER — CALCIUM CARBONATE 500 MG/1
2 TABLET, CHEWABLE ORAL 3 TIMES DAILY PRN
OUTPATIENT
Start: 2025-02-10

## 2025-02-10 RX ORDER — ACETAMINOPHEN 325 MG/1
650 TABLET ORAL EVERY 4 HOURS PRN
Status: DISCONTINUED | OUTPATIENT
Start: 2025-02-10 | End: 2025-02-12 | Stop reason: HOSPADM

## 2025-02-10 RX ORDER — MISOPROSTOL 100 MCG
25 TABLET ORAL EVERY 4 HOURS
Status: DISCONTINUED | OUTPATIENT
Start: 2025-02-11 | End: 2025-02-10

## 2025-02-10 RX ORDER — ONDANSETRON 2 MG/ML
4 INJECTION INTRAMUSCULAR; INTRAVENOUS EVERY 6 HOURS PRN
Status: DISCONTINUED | OUTPATIENT
Start: 2025-02-10 | End: 2025-02-12 | Stop reason: HOSPADM

## 2025-02-10 RX ORDER — CITRIC ACID/SODIUM CITRATE 334-500MG
30 SOLUTION, ORAL ORAL ONCE AS NEEDED
Status: DISCONTINUED | OUTPATIENT
Start: 2025-02-10 | End: 2025-02-12 | Stop reason: HOSPADM

## 2025-02-10 RX ORDER — NIFEDIPINE 30 MG/1
30 TABLET, EXTENDED RELEASE ORAL ONCE
Status: COMPLETED | OUTPATIENT
Start: 2025-02-10 | End: 2025-02-10

## 2025-02-10 RX ORDER — SODIUM CHLORIDE 0.9 % (FLUSH) 0.9 %
10 SYRINGE (ML) INJECTION AS NEEDED
Status: DISCONTINUED | OUTPATIENT
Start: 2025-02-10 | End: 2025-02-12 | Stop reason: HOSPADM

## 2025-02-10 RX ORDER — TERBUTALINE SULFATE 1 MG/ML
0.25 INJECTION, SOLUTION SUBCUTANEOUS AS NEEDED
Status: DISCONTINUED | OUTPATIENT
Start: 2025-02-10 | End: 2025-02-12 | Stop reason: HOSPADM

## 2025-02-10 RX ORDER — MISOPROSTOL 100 MCG
25 TABLET ORAL EVERY 4 HOURS
Status: COMPLETED | OUTPATIENT
Start: 2025-02-10 | End: 2025-02-11

## 2025-02-10 RX ADMIN — NIFEDIPINE 30 MG: 30 TABLET, FILM COATED, EXTENDED RELEASE ORAL at 12:17

## 2025-02-10 RX ADMIN — SODIUM CHLORIDE, SODIUM LACTATE, POTASSIUM CHLORIDE, CALCIUM CHLORIDE 125 ML/HR: 20; 30; 600; 310 INJECTION, SOLUTION INTRAVENOUS at 23:39

## 2025-02-10 RX ADMIN — Medication 25 MCG: at 22:03

## 2025-02-10 RX ADMIN — SODIUM CHLORIDE, SODIUM LACTATE, POTASSIUM CHLORIDE, CALCIUM CHLORIDE 125 ML/HR: 20; 30; 600; 310 INJECTION, SOLUTION INTRAVENOUS at 12:52

## 2025-02-10 RX ADMIN — Medication 25 MCG: at 13:24

## 2025-02-10 RX ADMIN — Medication 25 MCG: at 17:26

## 2025-02-10 NOTE — H&P
[facility]  Dominique Tee  : 1990  MRN: [id number]  CSN: [id number]    History and Physical    Subjective   Dominique Tee is a 35 y.o. year old  with an Estimated Date of Delivery: 25 scheduled for induction of labor on 2/10/2025 due to gestational HTN & unfavorable cervix.  Prenatal care has been with Dr. Roberto & Bobbi Pritchard CNM .  It has been benign.    OB History    Para Term  AB Living   2 0 0 0 1 0   SAB IAB Ectopic Molar Multiple Live Births   0 0 0 0 0 0      # Outcome Date GA Lbr Tacho/2nd Weight Sex Type Anes PTL Lv   2 Current            1 AB              Past Medical History:   Diagnosis Date    Heart murmur      Past Surgical History:   Procedure Laterality Date    ADENOIDECTOMY      REFRACTIVE SURGERY      TONSILLECTOMY      WISDOM TOOTH EXTRACTION       No current facility-administered medications for this encounter.    Allergies   Allergen Reactions    Silver Hives    Zoloft [Sertraline Hcl] Swelling    Allevyn Adhesive [Wound Dressings] Rash    Amoxicillin Rash     Social History    Tobacco Use      Smoking status: Former        Packs/day: 1.00        Years: 1 pack/day for 6.1 years (6.1 ttl pk-yrs)        Types: Cigarettes        Start date: 2019      Smokeless tobacco: Never    Review of Systems      Objective   /82   Pulse 87   Temp 98.2 °F (36.8 °C) (Oral)   Resp 16   LMP 2024 (Exact Date)   General: well developed; well nourished  no acute distress  mentation appropriate   Heart: regular rate and rhythm   Lungs: breathing is unlabored  clear to auscultation bilaterally   Abdomen:  Cervix:  Presentation:  EFW by Leopold's:  EFW by recent u/s: soft, non-tender; no masses  no umbilical or inguinal hernias are present  no hepato-splenomegaly  was checked (by 0): 0 cm / 0 % / -2  Vertex         Prenatal Labs  Lab Results   Component Value Date    HGB 12.9 02/10/2025    HEPBSAG Negative 2024    ABO A 02/10/2025    RH Negative  02/10/2025    ABSCRN Negative 02/10/2025    [id number] Non Reactive 2024    URINECX Final report (A) 2024       Recent Labs  Lab Results   Component Value Date    HGB 12.9 02/10/2025    HCT 37.6 02/10/2025    WBC 11.29 (H) 02/10/2025     02/10/2025           Assessment   IUP with an Estimated Date of Delivery: 25  Induction of labor scheduled on today for unfavorable cervix and gestational HTN .  The patient's pelvis feels clinically adequate for IOL to be appropriate, although she understands that this clinical judgement is not always accurate.  Group B Strep status: negative         Plan   Risks and benefits of induction discussed.  Patient understands that IOL increases the risk of  delivery over spontaneous labor, especially if the patient does not have a favorable cervix.    Bobbi Pritchard CNM  2/10/2025

## 2025-02-10 NOTE — PROGRESS NOTES
aFcundo De MD  [facility]  [address]  [place] [address]  Office [phone]  Fax [phone]      [facility]  Dominique Tee  : 1990  MRN: [id number]  CSN: [id number]    Labor progress note    Subjective   She reports is having no problems       Objective   Min/max vitals past 24 hours:  Temp  Min: 97.7 °F (36.5 °C)  Max: 98.2 °F (36.8 °C)   BP  Min: 126/81  Max: 143/85   Pulse  Min: 74  Max: 92   Resp  Min: 15  Max: 16        FHT's: reactive, reassuring and category 1.  external monitors used   Cervix: was not checked.   Contractions: irregular        Result Review   Results from last 7 days   Lab Units 02/10/25  1059   WBC 10*3/mm3 11.29*   HEMOGLOBIN g/dL 12.9   HEMATOCRIT % 37.6   PLATELETS 10*3/mm3 235   POTASSIUM mmol/L 3.8   ALT (SGPT) U/L 15   AST (SGOT) U/L 19   CREATININE mg/dL 0.40*   BILIRUBIN mg/dL 0.2   LDH U/L 165   URIC ACID mg/dL 3.8     Lab Results   Component Value Date    STREPGPB Negative 2025              Assessment   IUP at 39w5d  Gestational HTN  Gbs negative  A negative     Plan   Continue cervical ripening, cytotec 25 mcg PO q4H  Monitor for signs/symptoms of severe features  Allow labor to continue pending maternal and fetal status  Plan discussed with family and questions answered.  Understanding verbalized.    Facundo De MD  2/10/2025  16:53 CST

## 2025-02-11 ENCOUNTER — ANESTHESIA (OUTPATIENT)
Dept: LABOR AND DELIVERY | Facility: HOSPITAL | Age: 35
End: 2025-02-11
Payer: COMMERCIAL

## 2025-02-11 ENCOUNTER — ANESTHESIA EVENT (OUTPATIENT)
Dept: LABOR AND DELIVERY | Facility: HOSPITAL | Age: 35
End: 2025-02-11
Payer: COMMERCIAL

## 2025-02-11 LAB
AMPHET+METHAMPHET UR QL: NEGATIVE
AMPHETAMINES UR QL: NEGATIVE
BARBITURATES UR QL SCN: NEGATIVE
BENZODIAZ UR QL SCN: NEGATIVE
BUPRENORPHINE SERPL-MCNC: NEGATIVE NG/ML
CANNABINOIDS SERPL QL: NEGATIVE
COCAINE UR QL: NEGATIVE
CREAT UR-MCNC: 79.8 MG/DL
FENTANYL UR-MCNC: NEGATIVE NG/ML
METHADONE UR QL SCN: NEGATIVE
OPIATES UR QL: NEGATIVE
OXYCODONE UR QL SCN: NEGATIVE
PCP UR QL SCN: NEGATIVE
PROT UR-MCNC: 12.5 MG/DL
PROT/CREAT UR: 157 MG/G CREAT (ref 0–200)
TRICYCLICS UR QL SCN: NEGATIVE

## 2025-02-11 PROCEDURE — 25010000002 MORPHINE PER 10 MG

## 2025-02-11 PROCEDURE — 10907ZC DRAINAGE OF AMNIOTIC FLUID, THERAPEUTIC FROM PRODUCTS OF CONCEPTION, VIA NATURAL OR ARTIFICIAL OPENING: ICD-10-PCS | Performed by: OBSTETRICS & GYNECOLOGY

## 2025-02-11 PROCEDURE — 3E033VJ INTRODUCTION OF OTHER HORMONE INTO PERIPHERAL VEIN, PERCUTANEOUS APPROACH: ICD-10-PCS

## 2025-02-11 PROCEDURE — 80307 DRUG TEST PRSMV CHEM ANLYZR: CPT | Performed by: OBSTETRICS & GYNECOLOGY

## 2025-02-11 PROCEDURE — 25810000003 LACTATED RINGERS PER 1000 ML

## 2025-02-11 PROCEDURE — 25010000002 ROPIVACAINE PER 1 MG: Performed by: NURSE ANESTHETIST, CERTIFIED REGISTERED

## 2025-02-11 PROCEDURE — C1755 CATHETER, INTRASPINAL: HCPCS | Performed by: NURSE ANESTHETIST, CERTIFIED REGISTERED

## 2025-02-11 PROCEDURE — 25010000002 LIDOCAINE PF 2% 2 % SOLUTION: Performed by: NURSE ANESTHETIST, CERTIFIED REGISTERED

## 2025-02-11 PROCEDURE — 25010000002 FENTANYL CITRATE (PF) 100 MCG/2ML SOLUTION: Performed by: NURSE ANESTHETIST, CERTIFIED REGISTERED

## 2025-02-11 RX ORDER — DOCUSATE SODIUM 100 MG/1
100 CAPSULE, LIQUID FILLED ORAL 2 TIMES DAILY
Status: DISCONTINUED | OUTPATIENT
Start: 2025-02-11 | End: 2025-02-12

## 2025-02-11 RX ORDER — DEXMEDETOMIDINE HYDROCHLORIDE 100 UG/ML
INJECTION, SOLUTION INTRAVENOUS AS NEEDED
Status: DISCONTINUED | OUTPATIENT
Start: 2025-02-11 | End: 2025-02-12 | Stop reason: SURG

## 2025-02-11 RX ORDER — FENTANYL CITRATE 50 UG/ML
INJECTION, SOLUTION INTRAMUSCULAR; INTRAVENOUS AS NEEDED
Status: DISCONTINUED | OUTPATIENT
Start: 2025-02-11 | End: 2025-02-12 | Stop reason: SURG

## 2025-02-11 RX ORDER — OXYTOCIN/0.9 % SODIUM CHLORIDE 30/500 ML
2-20 PLASTIC BAG, INJECTION (ML) INTRAVENOUS
Status: DISCONTINUED | OUTPATIENT
Start: 2025-02-11 | End: 2025-02-12

## 2025-02-11 RX ORDER — NIFEDIPINE 30 MG/1
30 TABLET, EXTENDED RELEASE ORAL DAILY
Status: DISCONTINUED | OUTPATIENT
Start: 2025-02-11 | End: 2025-02-14 | Stop reason: HOSPADM

## 2025-02-11 RX ORDER — ROPIVACAINE HYDROCHLORIDE 2 MG/ML
INJECTION, SOLUTION EPIDURAL; INFILTRATION; PERINEURAL AS NEEDED
Status: DISCONTINUED | OUTPATIENT
Start: 2025-02-11 | End: 2025-02-12 | Stop reason: SURG

## 2025-02-11 RX ORDER — LIDOCAINE HYDROCHLORIDE AND EPINEPHRINE 15; 5 MG/ML; UG/ML
INJECTION, SOLUTION EPIDURAL AS NEEDED
Status: DISCONTINUED | OUTPATIENT
Start: 2025-02-11 | End: 2025-02-12 | Stop reason: SURG

## 2025-02-11 RX ORDER — DIPHENHYDRAMINE HCL 25 MG
25 CAPSULE ORAL NIGHTLY PRN
Status: DISCONTINUED | OUTPATIENT
Start: 2025-02-11 | End: 2025-02-12

## 2025-02-11 RX ORDER — LIDOCAINE HYDROCHLORIDE 20 MG/ML
INJECTION, SOLUTION EPIDURAL; INFILTRATION; INTRACAUDAL; PERINEURAL AS NEEDED
Status: DISCONTINUED | OUTPATIENT
Start: 2025-02-11 | End: 2025-02-12 | Stop reason: SURG

## 2025-02-11 RX ADMIN — DEXMEDETOMIDINE 20 MCG: 100 INJECTION, SOLUTION INTRAVENOUS at 22:45

## 2025-02-11 RX ADMIN — Medication 25 MCG: at 02:19

## 2025-02-11 RX ADMIN — LIDOCAINE HYDROCHLORIDE 3 ML: 20 INJECTION, SOLUTION EPIDURAL; INFILTRATION; INTRACAUDAL; PERINEURAL at 22:28

## 2025-02-11 RX ADMIN — LIDOCAINE HYDROCHLORIDE 5 ML: 20 INJECTION, SOLUTION EPIDURAL; INFILTRATION; INTRACAUDAL; PERINEURAL at 22:20

## 2025-02-11 RX ADMIN — NIFEDIPINE 30 MG: 30 TABLET, FILM COATED, EXTENDED RELEASE ORAL at 09:36

## 2025-02-11 RX ADMIN — SODIUM CHLORIDE, SODIUM LACTATE, POTASSIUM CHLORIDE, CALCIUM CHLORIDE 125 ML/HR: 20; 30; 600; 310 INJECTION, SOLUTION INTRAVENOUS at 11:21

## 2025-02-11 RX ADMIN — DOCUSATE SODIUM 100 MG: 100 CAPSULE, LIQUID FILLED ORAL at 20:43

## 2025-02-11 RX ADMIN — MORPHINE SULFATE 2 MG: 2 INJECTION, SOLUTION INTRAMUSCULAR; INTRAVENOUS at 20:40

## 2025-02-11 RX ADMIN — ROPIVACAINE HYDROCHLORIDE 10 ML: 2 INJECTION, SOLUTION EPIDURAL; INFILTRATION at 22:39

## 2025-02-11 RX ADMIN — LIDOCAINE HYDROCHLORIDE AND EPINEPHRINE 3 ML: 15; 5 INJECTION, SOLUTION EPIDURAL at 22:35

## 2025-02-11 RX ADMIN — FENTANYL CITRATE 100 MCG: 50 INJECTION, SOLUTION INTRAMUSCULAR; INTRAVENOUS at 22:39

## 2025-02-11 RX ADMIN — Medication 2 MILLI-UNITS/MIN: at 09:36

## 2025-02-11 RX ADMIN — ROPIVACAINE HYDROCHLORIDE 4 ML/HR: 2 INJECTION, SOLUTION EPIDURAL; INFILTRATION at 22:45

## 2025-02-11 NOTE — PROGRESS NOTES
Bobbi Pritchard CNM  [facility]  [address]  [place] [address]  Office [phone]  Fax [phone]      [facility]  Dominique Tee  : 1990  MRN: [id number]  CSN: [id number]    Labor progress note    Subjective   She reports is having no problems     Objective   Min/max vitals past 24 hours:  Temp  Min: 97.8 °F (36.6 °C)  Max: 98.4 °F (36.9 °C)   BP  Min: 101/62  Max: 138/99   Pulse  Min: 65  Max: 93   Resp  Min: 15  Max: 17        FHT's: reactive and category 1.  external monitors used   Cervix: was checked (by me): 2 cm / 80 % / -2   Contractions: irregular      Assessment   IUP at 39w6d  TN     Plan   Stop pitocin. Allow patient to eat dinner. Restart pitocin at 1900, may increase to max of 20 mu per policy.  Plan discussed with family and questions answered.  Understanding verbalized.    Bobbi Pritchard CNM  2025  16:27 CST

## 2025-02-11 NOTE — PROGRESS NOTES
Rio Barakatayla Tee  : 1990  MRN: [id number]  CSN: [id number]    Labor progress note    Subjective   Patient currently reports is having no problems     Objective   Min/max vitals past 24 hours:  Temp  Min: 97.7 °F (36.5 °C)  Max: 98.4 °F (36.9 °C)   BP  Min: 101/62  Max: 143/85   Pulse  Min: 65  Max: 93   Resp  Min: 15  Max: 17        FHT's: reactive   Cervix: Checked by me: tight 1/50/posterior, soft   Contractions: regular          Assessment   IUP at 39w6d  GBS negative  Gestational HTN requiring procardia xl      Plan   S/P 4 doses of cytotec. Cervix now tight 1/50.  Pt allowed to eat and shower. Then will try pitocin.      Dominique Roberto MD  2025  08:53 CST

## 2025-02-11 NOTE — DISCHARGE INSTR - APPOINTMENTS
Appointment with Fiona Brewer on February 17th at 10:00 am    Appointment with Bobbi Pritchard on February 20th at 10:15 am     Appointment with  on March 25th at 1:00 pm

## 2025-02-11 NOTE — PLAN OF CARE
"Goal Outcome Evaluation:  Plan of Care Reviewed With: patient, spouse        Progress: no change  Outcome Evaluation:  39w6d IOL for elevated BP. VSS. Recieving procardia for blood pressures. Patient has been given 4 doses of cytotec. Currently reporting no pain with contractions and states that she has only felt \"some very minor cramping on and off\" throughout the night. Regular diet. SCDs on. Up ad ascencion to restroom. Spouse in room, attentive to patient.                             "

## 2025-02-11 NOTE — PLAN OF CARE
Goal Outcome Evaluation:              Outcome Evaluation:  39w6d IOL for elevated BP. VSS. Recieving procardia for blood pressure. Patient has been given Pitocin today, Pitocin turned off at 1609 until 190 to allow patient to have dinner. Currently reporting pain with contraction 7/10. Up ad ascencion. LR infusing at 75ml/hr. Spouse in room, attentive to patient.

## 2025-02-11 NOTE — LACTATION NOTE
Mother's Name: Dominique Phone #:[phone]  Infant Name: Azael  :  Gestation:  Day of life:  Birth weight:    Discharge weight:  Weight Loss:   24 hour Summary of Feeds:  Voids:  Stools:  Assistive devices (shields, shells, etc):  Significant Maternal history:,  heart murmur, +THC on prenatals - on admission  Maternal Concerns:  denies  Maternal Goal: up to 1 year  Mother's Medications: magnesium, PNV  Breastpump for home: Spectra S2  Ped follow up appt:    Visit with patient and spouse prior to labor onset, with permission, initial breastfeeding education provided. Due to previous +THC on prenatal labs, educated regarding  recommendations for cessation of THC use while breastfeeding. Mother reports she has not used since becoming pregnant and does not plan to resume. Handouts given and reviewed. Discussed milk supply/demand, importance of STS after delivery, breastfeeding on demand or waking infant every 3 hours. Discussed benefits of delayed bathing beyond 6 hours. Questions addressed.  Reassurance provided for staff support with breastfeeding. Will follow after delivery.     Instructed patient our lactation team is here for continued support throughout their breastfeeding journey. Our team has encouraged patient to call with any questions or concerns that may arise after discharge.     Breastfeeding and Diaper Chart  Check List for Essentials of Positioning And Latch-on handout provided by [employer]  Hand Expression handout provided by [employer]  Five Keys to Successful Breastfeeding handout provided by [employer]    The Many Benefits if Breastfeeding handout given  Breastfeeding saves time  *Breastfeeding allows you to calm or feed your baby immediately, which leads to a happier baby who cries less  *There is nothing to buy, prepare, or maintain.There is nothing to clean or sterilize.  Breastfeeding builds a mothers confidence  *She knows all  her baby needs to thrive is her!  Breastfeeding saves Money  *There is no formula to buy and healthier breast fed babies have less medical costs  Healthy Mom/Healthy baby  * babies get sick less often, and when they do they are usually sick less severely and for a shorter time  * babies have fewer ear infections  * babies have fewer allergies  *Mothers who breastfeed have a lower risk for cancer, osteoporosis, anemia, high blood pressure, obesity, and Type ll diabetes  *Mothers miss less work days with sick babies  Breast fed babies have a better dental health  * babies have better jaw development which requires lest orthodontic work  *Breast milk does not promote cavities  * babies can nurse at night without worry of tooth decay  Breastfeeding allows a baby to reach his full IQ potential  *The longer a baby is breast fed, the better their brain development  Breast fed babies and moms are more relaxed  *The hormones released during breastfeeding have a calming effect on mothers  *Breastfeeding requires mom to take a break; this may help mom get more rest after delivery  *Breastfeeding is quicker than preparing formula which allows mom and baby to get back to sleep faster  *Breastfeeding promotes bonding and allows mom to learn babies cues and care needs more quickly  Breastfeeding cleanup is easier  *The bowel movements and spit up of breast fed babies doesn't smell as bad  *Spit-up of breast fed babies doesn't stain clothing  Getting out of the hourse is easier  *No formula bottles to prepare and carry safely   *No time restraints due to worry about what baby will eat  *No worries about warming a bottle or finding safe water to prepare bottles  Breastfeeding mother get their bodies back sooner  *The uterus shrinks more quickly and completely, which allows a flatter tummy  *Breastfeeding burns 400-500 calories a day; making milk torches stored fat!  Breastfeeding is  better for the environment  *There is no trash to dispose of after breastfeeding  *There is no production facility to produce breast milk; moms body does it all without the pollution of a factory      Your Guide to Breastfeeding Booklet by [employer], www.[employer]      Safe Storage of Breastmilk magnet: [employer]

## 2025-02-12 PROCEDURE — 0UQMXZZ REPAIR VULVA, EXTERNAL APPROACH: ICD-10-PCS | Performed by: OBSTETRICS & GYNECOLOGY

## 2025-02-12 PROCEDURE — 25010000002 LIDOCAINE 2% SOLUTION: Performed by: OBSTETRICS & GYNECOLOGY

## 2025-02-12 PROCEDURE — 25010000002 LIDOCAINE 2% SOLUTION

## 2025-02-12 PROCEDURE — 0KQM0ZZ REPAIR PERINEUM MUSCLE, OPEN APPROACH: ICD-10-PCS | Performed by: OBSTETRICS & GYNECOLOGY

## 2025-02-12 RX ORDER — MISOPROSTOL 200 UG/1
800 TABLET ORAL ONCE
Status: DISCONTINUED | OUTPATIENT
Start: 2025-02-12 | End: 2025-02-12 | Stop reason: HOSPADM

## 2025-02-12 RX ORDER — PRENATAL VIT/IRON FUM/FOLIC AC 27MG-0.8MG
1 TABLET ORAL DAILY
Status: DISCONTINUED | OUTPATIENT
Start: 2025-02-12 | End: 2025-02-14 | Stop reason: HOSPADM

## 2025-02-12 RX ORDER — SODIUM CHLORIDE 0.9 % (FLUSH) 0.9 %
1-10 SYRINGE (ML) INJECTION AS NEEDED
Status: DISCONTINUED | OUTPATIENT
Start: 2025-02-12 | End: 2025-02-14 | Stop reason: HOSPADM

## 2025-02-12 RX ORDER — ONDANSETRON 2 MG/ML
4 INJECTION INTRAMUSCULAR; INTRAVENOUS EVERY 6 HOURS PRN
Status: DISCONTINUED | OUTPATIENT
Start: 2025-02-12 | End: 2025-02-14 | Stop reason: HOSPADM

## 2025-02-12 RX ORDER — LIDOCAINE HYDROCHLORIDE 20 MG/ML
INJECTION, SOLUTION INFILTRATION; PERINEURAL
Status: COMPLETED
Start: 2025-02-12 | End: 2025-02-12

## 2025-02-12 RX ORDER — CARBOPROST TROMETHAMINE 250 UG/ML
250 INJECTION, SOLUTION INTRAMUSCULAR AS NEEDED
Status: DISCONTINUED | OUTPATIENT
Start: 2025-02-12 | End: 2025-02-12 | Stop reason: HOSPADM

## 2025-02-12 RX ORDER — OXYTOCIN/0.9 % SODIUM CHLORIDE 30/500 ML
999 PLASTIC BAG, INJECTION (ML) INTRAVENOUS ONCE
Status: DISCONTINUED | OUTPATIENT
Start: 2025-02-12 | End: 2025-02-12 | Stop reason: HOSPADM

## 2025-02-12 RX ORDER — OXYTOCIN/0.9 % SODIUM CHLORIDE 30/500 ML
250 PLASTIC BAG, INJECTION (ML) INTRAVENOUS CONTINUOUS
Status: DISPENSED | OUTPATIENT
Start: 2025-02-12 | End: 2025-02-12

## 2025-02-12 RX ORDER — HYDROCORTISONE 25 MG/G
CREAM TOPICAL AS NEEDED
Status: DISCONTINUED | OUTPATIENT
Start: 2025-02-12 | End: 2025-02-14 | Stop reason: HOSPADM

## 2025-02-12 RX ORDER — TRAMADOL HYDROCHLORIDE 50 MG/1
50 TABLET ORAL EVERY 6 HOURS PRN
Status: DISCONTINUED | OUTPATIENT
Start: 2025-02-12 | End: 2025-02-14 | Stop reason: HOSPADM

## 2025-02-12 RX ORDER — ONDANSETRON 4 MG/1
4 TABLET, ORALLY DISINTEGRATING ORAL EVERY 6 HOURS PRN
Status: DISCONTINUED | OUTPATIENT
Start: 2025-02-12 | End: 2025-02-14 | Stop reason: HOSPADM

## 2025-02-12 RX ORDER — LIDOCAINE HYDROCHLORIDE 20 MG/ML
10 INJECTION, SOLUTION INFILTRATION; PERINEURAL ONCE
Status: COMPLETED | OUTPATIENT
Start: 2025-02-12 | End: 2025-02-12

## 2025-02-12 RX ORDER — CALCIUM CARBONATE 500 MG/1
2 TABLET, CHEWABLE ORAL 3 TIMES DAILY PRN
Status: DISCONTINUED | OUTPATIENT
Start: 2025-02-12 | End: 2025-02-14 | Stop reason: HOSPADM

## 2025-02-12 RX ORDER — IBUPROFEN 600 MG/1
600 TABLET, FILM COATED ORAL EVERY 6 HOURS PRN
Status: DISCONTINUED | OUTPATIENT
Start: 2025-02-12 | End: 2025-02-12 | Stop reason: HOSPADM

## 2025-02-12 RX ORDER — OXYTOCIN/0.9 % SODIUM CHLORIDE 30/500 ML
125 PLASTIC BAG, INJECTION (ML) INTRAVENOUS ONCE AS NEEDED
Status: DISCONTINUED | OUTPATIENT
Start: 2025-02-12 | End: 2025-02-14 | Stop reason: HOSPADM

## 2025-02-12 RX ORDER — BISACODYL 10 MG
10 SUPPOSITORY, RECTAL RECTAL DAILY PRN
Status: DISCONTINUED | OUTPATIENT
Start: 2025-02-13 | End: 2025-02-14 | Stop reason: HOSPADM

## 2025-02-12 RX ORDER — IBUPROFEN 600 MG/1
600 TABLET, FILM COATED ORAL EVERY 6 HOURS PRN
Status: DISCONTINUED | OUTPATIENT
Start: 2025-02-12 | End: 2025-02-14 | Stop reason: HOSPADM

## 2025-02-12 RX ORDER — METHYLERGONOVINE MALEATE 0.2 MG/ML
200 INJECTION INTRAVENOUS ONCE AS NEEDED
Status: DISCONTINUED | OUTPATIENT
Start: 2025-02-12 | End: 2025-02-12 | Stop reason: HOSPADM

## 2025-02-12 RX ORDER — DOCUSATE SODIUM 100 MG/1
100 CAPSULE, LIQUID FILLED ORAL 2 TIMES DAILY
Status: DISCONTINUED | OUTPATIENT
Start: 2025-02-12 | End: 2025-02-14 | Stop reason: HOSPADM

## 2025-02-12 RX ORDER — ACETAMINOPHEN 325 MG/1
650 TABLET ORAL EVERY 6 HOURS PRN
Status: DISCONTINUED | OUTPATIENT
Start: 2025-02-12 | End: 2025-02-14 | Stop reason: HOSPADM

## 2025-02-12 RX ADMIN — IBUPROFEN 600 MG: 600 TABLET, FILM COATED ORAL at 12:09

## 2025-02-12 RX ADMIN — LIDOCAINE HYDROCHLORIDE 10 ML: 20 INJECTION, SOLUTION INFILTRATION; PERINEURAL at 11:47

## 2025-02-12 RX ADMIN — Medication 1 APPLICATION: at 15:21

## 2025-02-12 RX ADMIN — LIDOCAINE HYDROCHLORIDE 10 ML: 20 INJECTION, SOLUTION INFILTRATION; PERINEURAL at 11:29

## 2025-02-12 RX ADMIN — IBUPROFEN 600 MG: 600 TABLET, FILM COATED ORAL at 17:53

## 2025-02-12 RX ADMIN — PRENATAL VIT W/ FE FUMARATE-FA TAB 27-0.8 MG 1 TABLET: 27-0.8 TAB at 15:21

## 2025-02-12 RX ADMIN — Medication 250 ML/HR: at 12:00

## 2025-02-12 RX ADMIN — DOCUSATE SODIUM 100 MG: 100 CAPSULE, LIQUID FILLED ORAL at 20:57

## 2025-02-12 RX ADMIN — Medication 250 ML/HR: at 13:52

## 2025-02-12 NOTE — PROGRESS NOTES
Rio Tee  : 1990  MRN: [id number]  CSN: [id number]    Labor progress note    Subjective   Patient currently reports is feeling painful contractions     Objective   Min/max vitals past 24 hours:  Temp  Min: 97.8 °F (36.6 °C)  Max: 98.4 °F (36.9 °C)   BP  Min: 101/62  Max: 138/99   Pulse  Min: 65  Max: 93   Resp  Min: 15  Max: 17        FHT's: reactive, reassuring  145 + accels, with occasional mild variable decelerations.  Moderate variability.   Cervix: was checked (by me): 2-3 cm / 80 % / -3.  Soft and very posterior.  AROM now   Contractions: Occasional.  Pitocin has been off since 1600          Assessment   IUP at 39w6d    IOL for gestational hypertension requiring Procardia XL  GBS negative  Fetus reassuring     Plan   Continue with induction  AROM - clear fluid seen     Carolyn Ramirez MD  2025  19:14 CST

## 2025-02-12 NOTE — LACTATION NOTE
Mother's Name: Dominique Phone #:[phone]  Infant Name: Azael  :2023  Gestation:40w0d  Day of life:0  Birth weight:  8-7.8 (3850g) Discharge weight:  Weight Loss:   24 hour Summary of Feeds:  Voids:  Stools:  Assistive devices (shields, shells, etc):  Significant Maternal history:,  heart murmur, +THC on prenatals - on admission  Maternal Concerns:  denies  Maternal Goal: up to 1 year  Mother's Medications: magnesium, PNV  Breastpump for home: Spectra S2  Ped follow up appt:    Lactation not notified after delivery for feeding assistance. To room to f/u with patient, patient with multiple visitors in room at this time. Infant crying, held by family member. Mother reports first feeding went well, attempted feeding again apprx 30 mins ago d/t infant showing cues. But states infant fell asleep immediately upon STS. Infant is due to feed between 1430 -1530 per parents. Advised to attempt next feeding within 30 mins, call this RN for assistance. Reiterated lactation staff unavailable after 4 pm today, much encouragement to utilize lactation staff assistance with next feeding, sooner than later. Mother preparing to eat at this time and then will call for infant feeding. Encouragement and support provided.     Instructed patient our lactation team is here for continued support throughout their breastfeeding journey. Our team has encouraged patient to call with any questions or concerns that may arise after discharge.

## 2025-02-12 NOTE — LACTATION NOTE
Called back to room, mother in bed from bathroom, fob holding infant, infant remains swaddled x2 and gown. With permission, infant brought to crib, swaddle and gown removed. Small stool diaper noted and changed. Infant awake and crying. Moved to right breast in football hold. Infant immediately gapes wide and latches without difficulty. Exhibits deep jaw drops and occasional swallows noted. Infant nursed well for 15 mins, self released, belched. Demonstrated appropriate burping technique/position and shawn stimulus for waking infant and then moved to left breast in football hold. Infant required multiple attempts to achieve proper latch to left breast. Infant remains actively feeding on left breast upon end of lactation consultation. Reiterated recommendations for frequent STS bonding, feedings, on demand feedings or waking every 3 hours. Encourage use of hand expression during sleepy or difficult to obtain feedings. Provided hand pump, collection cups and syringes. To note, labor progression normal, patient actively pushed for 2 hours. Infant exam reveals significant molding and bruising to occipital region. Reported to ESTHER Hoskins, recommend infant pillow for comfort.

## 2025-02-12 NOTE — ANESTHESIA PROCEDURE NOTES
Labor Epidural      Patient reassessed immediately prior to procedure    Patient location during procedure: OB  Start Time: 2/11/2025 10:15 PM  Stop Time: 2/11/2025 10:45 PM  Performed By  CRNA/CAA: Jose Antonio Nichols CRNA  Preanesthetic Checklist  Completed: patient identified, IV checked, site marked, risks and benefits discussed, surgical consent, monitors and equipment checked, pre-op evaluation and timeout performed  Additional Notes  Patient states that her chiropractor has told her she has some scoliosis. This made for a somewhat technically difficult procedure. Second attempt was successful without incident.  Prep:  Pt Position:sitting  Sterile Tech:cap, gloves, mask and sterile barrier  Prep:DuraPrep  Monitoring:blood pressure monitoring, continuous pulse oximetry and EKG  Epidural Block Procedure:  Approach:midline  Guidance:landmark technique and palpation technique  Location:L2-L3  Needle Type:Tuohy  Needle Gauge:18 G  Loss of Resistance Medium: saline  Loss of Resistance: 9cm  Cath Depth at skin:15 cm  Paresthesia: none  Aspiration:negative  Test Dose:negative  Number of Attempts: 2  Post Assessment:  Dressing:occlusive dressing applied and secured with tape  Pt Tolerance:patient tolerated the procedure well with no apparent complications  Complications:no

## 2025-02-12 NOTE — PROGRESS NOTES
Rio  Dominique Tee  : 1990  MRN: [id number]  CSN: [id number]    Labor progress note    Subjective   Patient currently reports is feeling contractions more than earlier, and also starting to have some pressure     Objective   Min/max vitals past 24 hours:  Temp  Min: 97.8 °F (36.6 °C)  Max: 98.5 °F (36.9 °C)   BP  Min: 110/63  Max: 145/76   Pulse  Min: 69  Max: 101   Resp  Min: 16  Max: 16        FHT's: reactive, reassuring  140 +accels, no decels, moderate variability   Cervix: was checked (by me): 9 cm / 90 % / 0   Contractions: regular every 2 minutes          Assessment   IUP at 40w0d IOL for PIH requiring medication.  BP has been well-controlled overnight  Patient had AROM last PM at 1910 and has progressed well overnight  GBS negative  Fetus reassuring     Plan   Expectant management  [facility]  2025  06:25 CST

## 2025-02-12 NOTE — ANESTHESIA PREPROCEDURE EVALUATION
Anesthesia Evaluation     Patient summary reviewed and Nursing notes reviewed   NPO Solid Status: N/A  NPO Liquid Status: N/A           Airway   Mallampati: II  Dental - normal exam     Pulmonary - negative pulmonary ROS and normal exam   Cardiovascular - normal exam  Exercise tolerance: excellent (>7 METS)    Rhythm: regular  Rate: normal    (+) valvular problems/murmurs murmur      Neuro/Psych- negative ROS  GI/Hepatic/Renal/Endo    (+) obesity    Musculoskeletal     Abdominal   (+) obese   Substance History - negative use     OB/GYN    (+) Pregnant        Other - negative ROS                   Anesthesia Plan    ASA 2     epidural       Anesthetic plan, risks, benefits, and alternatives have been provided, discussed and informed consent has been obtained with: patient.    CODE STATUS:    Code Status (Patient has no pulse and is not breathing): CPR (Attempt to Resuscitate)  Medical Interventions (Patient has pulse or is breathing): Full Support

## 2025-02-12 NOTE — L&D DELIVERY NOTE
[facility]  Vaginal Delivery Note  Review the Delivery Report for details.       Delivery     Delivery: Vaginal, Spontaneous    YOB: 2023   Time of Birth:  Gestational Age 11:20 AM  40w0d     Anesthesia: Epidural    Delivering clinician: Bobbi Pritchard CNM; Dr Ashley tesfaye MD   Forceps?   No   Vacuum? No    Shoulder dystocia present: No        Delivery narrative:    The patient was noted to be completely dilated and effaced with the fetal head at the introitus. The fetal vertex was delivered OA spontaneously with maternal pushing efforts. yes; Number of nuchal loops present:  1. The right anterior shoulder was delivered atraumatically by maternal expulsive efforts with the assistance of routine downward traction. The posterior shoulder delivered with maternal expulsive efforts and routine upward traction. The remainder of the fetus delivered spontaneously. Upon delivery, the infant was noted to be vigorous and was passed to the mother's abdomen into the care of the awaiting infant care nurse. Following a 14 minute delay in cord clamping, the cord was clamped x2 and cut.  During the third stage of labor, IV pitocin was administered to enhance uterine contraction. The placenta delivered spontaneously, intact, with a three vessel cord.The cervix, vagina and perineum were inspected for lacerations. A 2nd degree laceration was appreciated and repaired with #3-O Vicryl to reapproximate the laceration in layers. Left labial laceration also repaired. Anesthesia during laceration repair: lidocaine. Good hemostasis was confirmed. No lacerations were noted to the cervix. Dr. Ramirez did laceration repair. The uterine fundus was firm at the end of the procedure. Mom and baby tolerated the delivery well. All needle, sponge, and instrument counts were noted to be correct x2 at the end of the procedure.       Infant    Findings: male infant     Infant observations: Weight: 3850 g (8 lb 7.8 oz)  Length: 21  "in  Observations/Comments:  34.5CM     Apgars: 8  @ 1 minute /    9  @ 5 minutes   Infant Name: Azael     Placenta, Cord, and Fluid    Placenta delivered  Spontaneous at   2/12/2025 11:39 AM    Cord: 3 vessels present.   Nuchal Cord?  yes; Number of nuchal loops present:  1   Cord blood obtained: Yes   Cord gases obtained:      Cord gas results: Venous:  No results found for: \"PHCVEN\", \"BECVEN\"    Arterial:  No results found for: \"PHCART\", \"BECART\"     Repair    Episiotomy: None    No    Lacerations: Yes  Laceration Information  Laceration Repaired?   Perineal: 2nd Yes   Periurethral:       Labial: right Yes   Sulcus:       Vaginal:       Cervical:         Suture used for repair: 3-0 Vicryl  Laceration Length for 3rd or 4th degree lacerations: n/a cm   Estimated Blood Loss:               Quantitative Blood Loss:    QBL from VAG DEL: 50 (02/12/25 1228)             Complications  hypertension    Disposition  Mother to Mother Baby/Postpartum  in stable condition currently.  Baby to remains with mom  in stable condition currently.      Bobbi Pritchard CNM  02/12/25  12:33 CST       "

## 2025-02-12 NOTE — PLAN OF CARE
Goal Outcome Evaluation:  Plan of Care Reviewed With: patient        Progress: improving  Outcome Evaluation: . 40w0d. IOL for GHTN. Low dose pitocin throughout night per order, currently infusing at 6mu/min. Epidural for pain control. SVE /-2. VSS.

## 2025-02-13 LAB
HCT VFR BLD AUTO: 29.6 % (ref 34–46.6)
HGB BLD-MCNC: 9.8 G/DL (ref 12–15.9)

## 2025-02-13 PROCEDURE — 85014 HEMATOCRIT: CPT

## 2025-02-13 PROCEDURE — 85018 HEMOGLOBIN: CPT

## 2025-02-13 RX ADMIN — TRAMADOL HYDROCHLORIDE 50 MG: 50 TABLET, COATED ORAL at 15:35

## 2025-02-13 RX ADMIN — TRAMADOL HYDROCHLORIDE 50 MG: 50 TABLET, COATED ORAL at 21:23

## 2025-02-13 RX ADMIN — DOCUSATE SODIUM 100 MG: 100 CAPSULE, LIQUID FILLED ORAL at 08:25

## 2025-02-13 RX ADMIN — DOCUSATE SODIUM 100 MG: 100 CAPSULE, LIQUID FILLED ORAL at 20:24

## 2025-02-13 RX ADMIN — IBUPROFEN 600 MG: 600 TABLET, FILM COATED ORAL at 08:25

## 2025-02-13 RX ADMIN — PRENATAL VIT W/ FE FUMARATE-FA TAB 27-0.8 MG 1 TABLET: 27-0.8 TAB at 12:06

## 2025-02-13 RX ADMIN — IBUPROFEN 600 MG: 600 TABLET, FILM COATED ORAL at 20:24

## 2025-02-13 RX ADMIN — IBUPROFEN 600 MG: 600 TABLET, FILM COATED ORAL at 01:00

## 2025-02-13 NOTE — PAYOR COMM NOTE
"ADMIT 2/10/2025  MATERNITY  REF:  [id number]   [facility]  CRISTEL,   [phone] OR  FAX  [phone]    Dominique Ewing (35 y.o. Female)       Date of Birth   1990    Social Security Number       Address   [address] [place] [address] [place] [address]    Home Phone   [phone]    MRN   [id number]       Adventist   Muslim    Marital Status                               Admission Date   2/10/25    Admission Type   Elective    Admitting Provider   Facundo De MD    Attending Provider   Dominique Roberto MD    Department, Room/Bed   [facility] MOTHER BABY 2A, M264/1       Discharge Date       Discharge Disposition       Discharge Destination                                 Attending Provider: Dominique Roberto MD    Allergies: Silver, Zoloft [Sertraline Hcl], Allevyn Adhesive [Wound Dressings], Amoxicillin    Isolation: None   Infection: None   Code Status: CPR    Ht: 170.2 cm (67.01\")   Wt: 98 kg (216 lb)    Admission Cmt: None   Principal Problem: Pregnancy [Z34.90]                   Active Insurance as of 2/10/2025       Primary Coverage       Payor Plan Insurance Group Employer/Plan Group    [employer]HE[employer] BLUE CROSS AN[employer]M BLUE CROSS BLUE SHIELD PPO [id number]       Payor Plan Address Payor Plan Phone Number Payor Plan Fax Number Effective Dates    [address] [phone]  2023 - None Entered    [place] [address]         Subscriber Name Subscriber Birth Date Member ID       KAYLINCLAUDIA PEREYRA NATO 1990 [id number]                     Emergency Contacts        (Rel.) Home Phone Work Phone Mobile Phone    CLAUDIA EWING (Spouse) [phone] -- [phone]     EDC 2025 G-2 P-1  40 WKS GESTATIONAL AGE       Rony Ewing [[id number]]    Labor Events     labor?: No   steroids?: None  Antibiotics during labor?: No  Rupture date/time: 2025 1910  Rupture type: artificial rupture of membranes  Fluid color: normal, " "clear  Fluid odor: None  Labor type: Induced Onset of Labor  Labor allowed to proceed with plans for an attempted vaginal birth?: Yes  Cervical ripening: Misoprostol  Induction: Oxytocin  First cervical ripening date/time: 2/10/2025 1324  Induction indications: Hypertension  Complications: None  Presentation    Presentation: Vertex  Position: Occiput Anterior   Delivery    Head delivery date/time: 2025 11:20:49  Delivery date/time: 2025 11:20 AM   Delivery type: Vaginal, Spontaneous   Details: Trial of labor?: Yes        Operative Delivery    Forceps attempted?: No  Vacuum extractor attempted?: No                  Hale Assessment    Living status: Living  Infant family band number: [id number]  Apgars   1 Minute: 5 Minute: 10 Minute 15 Minute 20 Minute   Skin Color: 0 1      Heart Rate: 2 2      Reflex Irritability: 2 2      Muscle Tone: 2 2      Respiratory Effort: 2 2      Total: 8 9              Apgars Assigned By: BEN LLANES RN  Resuscitation    Method: Suctioning, Tactile Stimulation, Dried  Suction Details  Suction method: bulb syringe  Airway suction: mouth, nose  Oxygen Details  Skin to Skin    Skin to skin initiated date/time: 2025 1120  Skin to skin with: Mother  Measurements    Weight: 8 lb 7.8 oz 3850 g Length: 21\"   Birth comments: 34.5CM  Delivery Information    Episiotomy: None  Perineal lacerations: 2nd Repaired: Yes   Labial laceration: right Repaired: Yes   Surgical or additional est. blood loss (mL): 0  Combined est. blood loss (mL): 0       History & Physical        Bobbi Pritchard CNM at 02/10/25 1233       Attestation signed by Dominique Roberto MD at 02/10/25 1509    I have reviewed this documentation and agree.                  [facility]  Dominique Tee  : 1990  MRN: [id number]  CSN: [id number]    History and Physical    Subjective  Dominique Tee is a 35 y.o. year old  with an Estimated Date of Delivery: 25 scheduled for induction " of labor on 2/10/2025 due to gestational HTN & unfavorable cervix.  Prenatal care has been with Dr. Roberto & Bobbi Pritchard CNM .  It has been benign.    OB History    Para Term  AB Living   2 0 0 0 1 0   SAB IAB Ectopic Molar Multiple Live Births   0 0 0 0 0 0      # Outcome Date GA Lbr Tacho/2nd Weight Sex Type Anes PTL Lv   2 Current            1 AB              Past Medical History:   Diagnosis Date    Heart murmur      Past Surgical History:   Procedure Laterality Date    ADENOIDECTOMY      REFRACTIVE SURGERY      TONSILLECTOMY      WISDOM TOOTH EXTRACTION       No current facility-administered medications for this encounter.    Allergies   Allergen Reactions    Silver Hives    Zoloft [Sertraline Hcl] Swelling    Allevyn Adhesive [Wound Dressings] Rash    Amoxicillin Rash     Social History    Tobacco Use      Smoking status: Former        Packs/day: 1.00        Years: 1 pack/day for 6.1 years (6.1 ttl pk-yrs)        Types: Cigarettes        Start date: 2019      Smokeless tobacco: Never    Review of Systems      Objective  /82   Pulse 87   Temp 98.2 °F (36.8 °C) (Oral)   Resp 16   LMP 2024 (Exact Date)   General: well developed; well nourished  no acute distress  mentation appropriate   Heart: regular rate and rhythm   Lungs: breathing is unlabored  clear to auscultation bilaterally   Abdomen:  Cervix:  Presentation:  EFW by Leopold's:  EFW by recent u/s: soft, non-tender; no masses  no umbilical or inguinal hernias are present  no hepato-splenomegaly  was checked (by 0): 0 cm / 0 % / -2  Vertex         Prenatal Labs  Lab Results   Component Value Date    HGB 12.9 02/10/2025    HEPBSAG Negative 2024    ABO A 02/10/2025    RH Negative 02/10/2025    ABSCRN Negative 02/10/2025    [id number] Non Reactive 2024    URINECX Final report (A) 2024       Recent Labs  Lab Results   Component Value Date    HGB 12.9 02/10/2025    HCT 37.6 02/10/2025    WBC 11.29 (H) 02/10/2025      02/10/2025           Assessment  IUP with an Estimated Date of Delivery: 25  Induction of labor scheduled on today for unfavorable cervix and gestational HTN .  The patient's pelvis feels clinically adequate for IOL to be appropriate, although she understands that this clinical judgement is not always accurate.  Group B Strep status: negative         Plan  Risks and benefits of induction discussed.  Patient understands that IOL increases the risk of  delivery over spontaneous labor, especially if the patient does not have a favorable cervix.    Bobbi Pritchard CNM  2/10/2025             Electronically signed by Dominique Roberto MD at 02/10/25 1509          Physician Progress Notes (last 4 days)        Carolyn Ramirez MD at 25 0624           R[place] Tee  : 1990  MRN: [id number]  CSN: [id number]    Labor progress note    Subjective   Patient currently reports is feeling contractions more than earlier, and also starting to have some pressure    Objective   Min/max vitals past 24 hours:  Temp  Min: 97.8 °F (36.6 °C)  Max: 98.5 °F (36.9 °C)   BP  Min: 110/63  Max: 145/76   Pulse  Min: 69  Max: 101   Resp  Min: 16  Max: 16        FHT's: reactive, reassuring  140 +accels, no decels, moderate variability   Cervix: was checked (by me): 9 cm / 90 % / 0   Contractions: regular every 2 minutes         Assessment   IUP at 40w0d IOL for PIH requiring medication.  BP has been well-controlled overnight  Patient had AROM last PM at 1910 and has progressed well overnight  GBS negative  Fetus reassuring    Plan   Expectant management  Patient positioned in throne    Carolyn Ramirez MD  2025  06:25 CST           Electronically signed by Carolyn Ramirez MD at 25 0630       Carolyn Ramirez MD at 25 1914           [place] Tee  : 1990  MRN: [id number]  CSN: [id number]    Labor progress note    Subjective   Patient currently reports is  feeling painful contractions    Objective   Min/max vitals past 24 hours:  Temp  Min: 97.8 °F (36.6 °C)  Max: 98.4 °F (36.9 °C)   BP  Min: 101/62  Max: 138/99   Pulse  Min: 65  Max: 93   Resp  Min: 15  Max: 17        FHT's: reactive, reassuring  145 + accels, with occasional mild variable decelerations.  Moderate variability.   Cervix: was checked (by me): 2-3 cm / 80 % / -3.  Soft and very posterior.  AROM now   Contractions: Occasional.  Pitocin has been off since 1600         Assessment   IUP at 39w6d    IOL for gestational hypertension requiring Procardia XL  GBS negative  Fetus reassuring    Plan   Continue with induction  AROM - clear fluid seen     Carolyn Ramirez MD  2025  19:14 CST           Electronically signed by Carolyn Ramirez MD at 25 1918       Dominique Roberto MD at 25 0853          [facility]  Dominique Tee  : 1990  MRN: [id number]  CSN: [id number]    Labor progress note    Subjective   Patient currently reports is having no problems    Objective   Min/max vitals past 24 hours:  Temp  Min: 97.7 °F (36.5 °C)  Max: 98.4 °F (36.9 °C)   BP  Min: 101/62  Max: 143/85   Pulse  Min: 65  Max: 93   Resp  Min: 15  Max: 17        FHT's: reactive   Cervix: Checked by me: tight 1/50/posterior, soft   Contractions: regular         Assessment   IUP at 39w6d  GBS negative  Gestational HTN requiring procardia xl     Plan   S/P 4 doses of cytotec. Cervix now tight 1/50.  Pt allowed to eat and shower. Then will try pitocin.      Dominique Roberto MD  2025  08:53 CST            Electronically signed by Dominique Roberto MD at 25 0807       Facundo De MD at 02/10/25 2173              Facundo De MD  [facility]  [address] [place] [address]  Office [phone]  Fax [phone]      [facility]  Dominique Tee  : 1990  MRN: [id number]  CSN: [id number]    Labor progress note    Subjective   She reports is having no  problems      Objective   Min/max vitals past 24 hours:  Temp  Min: 97.7 °F (36.5 °C)  Max: 98.2 °F (36.8 °C)   BP  Min: 126/81  Max: 143/85   Pulse  Min: 74  Max: 92   Resp  Min: 15  Max: 16        FHT's: reactive, reassuring and category 1.  external monitors used   Cervix: was not checked.   Contractions: irregular       Result Review   Results from last 7 days   Lab Units 02/10/25  1059   WBC 10*3/mm3 11.29*   HEMOGLOBIN g/dL 12.9   HEMATOCRIT % 37.6   PLATELETS 10*3/mm3 235   POTASSIUM mmol/L 3.8   ALT (SGPT) U/L 15   AST (SGOT) U/L 19   CREATININE mg/dL 0.40*   BILIRUBIN mg/dL 0.2   LDH U/L 165   URIC ACID mg/dL 3.8     Lab Results   Component Value Date    STREPGPB Negative 01/27/2025             Assessment   IUP at 39w5d  Gestational HTN  Gbs negative  A negative    Plan   Continue cervical ripening, cytotec 25 mcg PO q4H  Monitor for signs/symptoms of severe features  Allow labor to continue pending maternal and fetal status  Plan discussed with family and questions answered.  Understanding verbalized.    Facundo De MD  2/10/2025  16:53 CST            Electronically signed by Facundo De MD at 02/10/25 3399

## 2025-02-13 NOTE — PLAN OF CARE
Goal Outcome Evaluation:              Outcome Evaluation: VSS.  Fundus and lochia WDL. Pt unable to void post delivery-straight cath x1 this shift.  Pt due to void by 2230.  Pain at level of tolerance per pt with PO Motrin and comfort products.  Breastfeeding and bonding well with baby.

## 2025-02-13 NOTE — PLAN OF CARE
Goal Outcome Evaluation:           Progress: improving  Outcome Evaluation: VSS. FFMLU1- scant lochia. Patient has had a shower today. Patient IV taken out this shift. Patient is voiding and ambulating. Patient has had a bowel movement this shift. Patient pain is being controlled by PRN pain medication per orders. Patient is breastfeeding. EPDS was a 5.

## 2025-02-13 NOTE — PROGRESS NOTES
"      Bobbi Pritchard CNM  [facility]  [address]  [place] [address]  Office [phone]  Fax [phone]    [facility]  Vaginal Delivery Progress Note    Subjective   Postpartum Day 1: Vaginal Delivery    The patient feels well.  Her pain is well controlled with nonsteroidal anti-inflammatory drugs.   She is ambulating well.  Patient describes her bleeding as moderate lochia.    Breastfeeding: infant latching without difficulty without pain.    Objective     Vital Signs Range for the last 24 hours  Temperature: Temp:  [97.5 °F (36.4 °C)-98.2 °F (36.8 °C)] 98.2 °F (36.8 °C)   Temp Source: Temp src: Oral   BP: BP: (108-136)/(60-80) 124/77   Pulse: Heart Rate:  [] 95   Respirations: Resp:  [16-18] 16   SPO2: SpO2:  [97 %-100 %] 100 %   O2 Amount (l/min):     O2 Devices Device (Oxygen Therapy): room air   Weight:       Admit Height:         Physical Exam:  General:  no acute distresss.  Abdomen: abdomen is soft without significant tenderness, masses, organomegaly or guarding. Fundus: appropriate, firm, non tender  Extremities: normal, atraumatic, no cyanosis, and trace edema.       Lab results reviewed:  Yes   Rubella:  No results found for: \"RUBELLAIGGIN\" Nurse Transcribed from prenatal record --  No components found for: \"EXTRUBELQUAL\"  Rh Status:    RH type   Date Value Ref Range Status   02/10/2025 Negative  Final     Immunizations:   Immunization History   Administered Date(s) Administered    Rho (D) Immune Globulin 06/21/2024, 11/21/2024    Tdap 11/15/2021     Lab Results (last 24 hours)       Procedure Component Value Units Date/Time    Hemoglobin & Hematocrit, Blood [[id number]]  (Abnormal) Collected: 02/13/25 0815    Specimen: Blood Updated: 02/13/25 0857     Hemoglobin 9.8 g/dL      Hematocrit 29.6 %             External Prenatal Results       Pregnancy Outside Results - Transcribed From Office Records - See Scanned Records For Details       Test Value Date Time    ABO  A  02/10/25 1059 "    Rh  Negative  02/10/25 1059    Antibody Screen  Negative  02/10/25 1059       Negative  11/21/24 1105       Positive  07/01/24 1435       See Final Results  07/01/24 1435       Negative  06/21/24 0934    Varicella IgG  2,220 index 07/01/24 1435    Rubella  2.56 index 07/01/24 1435    Hgb  9.8 g/dL 02/13/25 0815       12.9 g/dL 02/10/25 1059       11.9 g/dL 11/21/24 1105       11.9 g/dL 09/23/24 1304       12.7 g/dL 07/01/24 1435    Hct  29.6 % 02/13/25 0815       37.6 % 02/10/25 1059       35.5 % 11/21/24 1105       35.5 % 09/23/24 1304       38.4 % 07/01/24 1435    HgB A1c        1h GTT  97 mg/dL 11/21/24 1105    3h GTT Fasting       3h GTT 1 hour       3h GTT 2 hour       3h GTT 3 hour        Gonorrhea (discrete)  Negative  01/27/25 1000       Negative  07/01/24 1435    Chlamydia (discrete)  Negative  01/27/25 1000       Negative  07/01/24 1435    RPR  Non Reactive  11/21/24 1105       Non Reactive  07/01/24 1435    Syphils cascade: TP-Ab (FTA)  Non-Reactive  02/10/25 1250    TP-Ab  Non-Reactive  02/10/25 1250    TP-Ab (EIA)       TPPA       HBsAg  Negative  07/01/24 1435    Herpes Simplex Virus PCR       Herpes Simplex VIrus Culture       HIV  Non Reactive  07/01/24 1435    Hep C RNA Quant PCR       Hep C Antibody       AFP       NIPT       Cystic Fibrosis ([place])  Negative  08/07/24 1608    Cystic Fibroisis        Spinal Muscular atrophy  Negative  08/07/24 1608    Fragile X       Group B Strep  Negative  01/27/25 1000    GBS Susceptibility to Clindamycin       GBS Susceptibility to Erythromycin       Fetal Fibronectin       Genetic Testing, Maternal Blood                 Drug Screening       Test Value Date Time    Urine Drug Screen       Amphetamine Screen  Negative  02/11/25 2211    Barbiturate Screen  Negative  02/11/25 2211    Benzodiazepine Screen  Negative  02/11/25 2211    Methadone Screen  Negative  02/11/25 2211    Phencyclidine Screen  Negative  02/11/25 2211    Opiates Screen  Negative  02/11/25  2211    THC Screen  Negative  02/11/25 2211    Cocaine Screen       Propoxyphene Screen       Buprenorphine Screen  Negative  02/11/25 2211    Methamphetamine Screen       Oxycodone Screen  Negative  02/11/25 2211    Tricyclic Antidepressants Screen  Negative  02/11/25 2211              Legend    ^: Historical                            Assessment & Plan       Pregnancy      Dominique Tee is Day 1  post-partum  Vaginal, Spontaneous  .      Plan:  Continue current care.      This note has been signed electronically.    Bobbi PEACE CNM  2/13/2025  10:39 CST

## 2025-02-13 NOTE — ANESTHESIA POSTPROCEDURE EVALUATION
Patient: Dominique Tee    Procedure Summary       Date: 02/11/25 Room / Location:     Anesthesia Start: 2215 Anesthesia Stop: 02/12/25 1120    Procedure: LABOR ANALGESIA Diagnosis:     Scheduled Providers:  Provider: Jose Antonio Nichols CRNA    Anesthesia Type: epidural ASA Status: 2            Anesthesia Type: epidural    Vitals  Vitals Value Taken Time   /77 02/13/25 0834   Temp 98.2 °F (36.8 °C) 02/13/25 0834   Pulse 95 02/13/25 0834   Resp 16 02/13/25 0834   SpO2 100 % 02/13/25 0834           Post Anesthesia Care and Evaluation    Patient location during evaluation: PHASE II  Patient participation: complete - patient participated  Level of consciousness: awake  Pain score: 0  Pain management: adequate    Airway patency: patent  Anesthetic complications: No anesthetic complications  PONV Status: none  Cardiovascular status: acceptable  Respiratory status: acceptable  Hydration status: acceptable  Post Neuraxial Block status: Motor and sensory function returned to baseline and No signs or symptoms of PDPH

## 2025-02-13 NOTE — PLAN OF CARE
Goal Outcome Evaluation:              Outcome Evaluation: VSS. FFMLU1. scant lochia. comfort products in use. voiding and ambulating. pain controlled w prn motrin. breastfeeding.

## 2025-02-13 NOTE — LACTATION NOTE
"Mother's Name: Dominique Phone #:[phone]  Infant Name: Azael  :2023  Gestation:40w0d  Day of life:1  Birth weight:  8-7.8 (3850g) Discharge weight:  Weight Loss: -2.47%  24 hour Summary of Feeds: 7BF 1 attempt Voids: 2 Stools:3  Assistive devices (shields, shells, etc):  Significant Maternal history:,  heart murmur, +THC on prenatals - on admission  Maternal Concerns:  denies  Maternal Goal: up to 1 year  Mother's Medications: magnesium, PNV  Breastpump for home: Spectra S2  Ped follow up appt:    F/u with patient to discuss feeding progress. Infant crying upon entering room, preparing for bath, fob reports last feeding ended 30 mins ago. Mother states infant is \"using me as a paci\", so has been pushing feedings off passed 3 hours to ensure infant feeds better. She reports he will begin cueing for feeding, but when brought to breast he quickly falls asleep. Advised not to deliberately withhold or delay feedings especially if infant cueing. Should continue feeding on demand. Be sure to keep infant STS during feedings, keep infant stimulated at breast by compressing breast during feeding, tickling his ribs,feet, cheeks, etc to keep infant active. Reassurance that infant is exhibiting typical behavior, sleepiness at 1 day old is very normal. Will worsen after bath as well. Encourage hand expression and sts after bath. Will follow up tomorrow.     Instructed patient our lactation team is here for continued support throughout their breastfeeding journey. Our team has encouraged patient to call with any questions or concerns that may arise after discharge.   "

## 2025-02-14 VITALS
SYSTOLIC BLOOD PRESSURE: 118 MMHG | HEART RATE: 95 BPM | DIASTOLIC BLOOD PRESSURE: 81 MMHG | OXYGEN SATURATION: 98 % | TEMPERATURE: 97.7 F | RESPIRATION RATE: 16 BRPM

## 2025-02-14 RX ORDER — TRAMADOL HYDROCHLORIDE 50 MG/1
50 TABLET ORAL EVERY 8 HOURS PRN
Qty: 3 TABLET | Refills: 0 | Status: SHIPPED | OUTPATIENT
Start: 2025-02-14

## 2025-02-14 RX ADMIN — IBUPROFEN 600 MG: 600 TABLET, FILM COATED ORAL at 05:04

## 2025-02-14 RX ADMIN — DOCUSATE SODIUM 100 MG: 100 CAPSULE, LIQUID FILLED ORAL at 08:59

## 2025-02-14 RX ADMIN — TRAMADOL HYDROCHLORIDE 50 MG: 50 TABLET, COATED ORAL at 07:29

## 2025-02-14 NOTE — LACTATION NOTE
Mother's Name: Dominique Phone #:[phone]  Infant Name: Azael  :2023  Gestation:40w0d  Day of life:2  Birth weight:  8-7.8 (3850g) Discharge weight: 7-14.6 (3590g)  Weight Loss: -6.75%  24 hour Summary of Feeds: 10 BF Voids: 3 Stools:5  Assistive devices (shields, shells, etc):  Significant Maternal history:,  heart murmur, +THC on prenatals - on admission  Maternal Concerns:  denies  Maternal Goal: up to 1 year  Mother's Medications: magnesium, PNV  Breastpump for home: Spectra S2  Ped follow up appt:, Dr. Ferrer    F/u with parents to discuss feeding progress and provide discharge education. Reviewed feedings, voids, stools and weight loss. Reassurance of good signs of breastfeeding. Mother reports tenderness to nipples, assessment performed, bilateral nipples with mild bruising and redness. Educated on importance of deep latch and correcting shallow latch. Breastfeeding after discharge handout given and reviewed. Encourage follow up with lactation tomorrow. Appt made for  at 0900. Instructions for appt provided. Questions denied.       Instructed patient our lactation team is here for continued support throughout their breastfeeding journey. Our team has encouraged patient to call with any questions or concerns that may arise after discharge.     Signs of Milk: Fullness, firmness, heaviness of breasts, leaking of milk.  Signs of Good Feed: Breast fullness prior to feed, breasts soft and comfortable after feeding. Infant content after feeding: calm, sleepy, relaxed and without continued hunger cues.  Signs of Plugged Ducts, Engorgement and Mastitis: Plugged ducts (milk entrapment in milk ducts)- small tender knots that often feel like little beans under breast tissue, usually tender. Massage on these areas of concern while breastfeeding or pumping to promote emptying.   Engorgement- fluid or excess milk, breasts become uncomfortably full, tight, firm  (compare to the firmness of your cheek (mild), chin (moderate) or forehead (severe). First line of treatment should be to BREASTFEED, if breasts remain full feeling after a feeding, it may be necessary to pump, ONLY UNTIL BREASTS ARE SOFT AND COMFORTABLE. DO NOT OVER PUMP (complete emptying of breasts can trigger even more milk which will cause continued, recurrent Engorgement).  Mastitis- Infection of the breast tissue, most often caused by plugged ducts that are not adequately treated by emptying, recurrent trauma to nipples or breasts (cracked or bleeding nipples). Signs: redness, swelling, tender knots or fever to breasts as well as generalized fever >101 degrees F that is often sudden onset. Treatment of mastitis, BREASTFEED! Pump after breastfeeding to achieve COMPLETE emptying of affected breast, utilizing massage to areas of concern, may use cold compress to affected area only after breast emptying. May take anti-inflammatories i.e. Ibuprofen, Motrin. CALL your OB for assessment and continued treatment with Antibiotics to adequately treat mastitis.  Infant Care: Over the first 2 weeks it is important to keep record of infant's feeding routine (feeding times and durations), wet and dirty diaper frequency, stool color and any spit ups that may occur.  Keep in mind, ALL babies will lose some weight initially (usually no more than 10% by day 3). Until infant returns to/ surpasses birth weight (which can take up to 2 weeks), it is important to offer feedings AT LEAST EVERY 3 HOURS. Remember, if you choose to supplement infant with formula or previously pumped milk, you should always pump in replacement of that feeding in order to promote and maintain a healthy milk supply!  Maternal Care: REST, sleep when the infant sleeps, stay hydrated (water is optimal) drink to thirst, increase caloric intake - breastfeeding mother's need an ADDITIONAL 500 calories per day , eat 3 meals/day as well as snacks in between, limit  CAFFIENE intake to 2 cups/day. Ask your significant other, family members or friends for help when needed, taking advantage of meal trains, allowing others to help with laundry, house chores, etc can help you focus on what is most important early on after delivery… you and your infant, and breastfeeding!   Medications to CONTINUE: Prenatal Vitamins are important to continue taking while breastfeeding. Fish oil, magnesium/calcium supplements often are helpful to support Mothers and their milk supply as well. Tylenol, Ibuprofen, regular Zyrtec, Claritin are SAFE if you suffer from seasonal allergies. Flonase is safe and often an effective medication to take if suffering from sinus drainage/pressure.  Medications to AVOID: Benadryl, Sudafed, any medications including “DM” or have a drying effect to sinus drainage will also dry a mother's milk up. Birth control- your OB will want to address birth control options with you usually around 4-6 weeks postpartum, be sure to notify your MD if you continue to breastfeed as some birth controls may significantly decrease your milk supply. Herbals- some herbs may also decrease your milk supply: PEPPERMINT, MENTHOL in any form (candies, essential oils, teas, etc), so check labels and avoid using in excess.  Pumping: Although we encourage you to focus on breastfeeding over the first 2-4 weeks, you will need to plan to begin pumping. We do recommend implementing pumping by the time infant is 4 weeks old. Pump 2-3 times per day immediately AFTER breastfeeding, it is normal to collect very small amounts initially, but the more consistently you pump, the more you will begin to collect. Store collected milk in refrigerator or freezer. You should also begin offering infant a bottle around 4 weeks. Remember to use slow flow nipples and PACE the bottle-feed. A bottle feed should take about as long as a breastfeeding session.

## 2025-02-14 NOTE — PLAN OF CARE
Goal Outcome Evaluation:              Outcome Evaluation: VSS. voiding, passing gas, had bm 2/13. emery area swollen and hurting. comfort products in use. using PRN pain meds. breastfeeding and bonding well w infant.

## 2025-02-14 NOTE — DISCHARGE SUMMARY
[facility]    Temitope Gilliamn, APRN  [address]  [place] [address]  [phone]      Discharge Summary     [place]  Dominique Tee  : 1990  MRN: [id number]  CSN: [id number]    Date of Admission: 2/10/2025   Date of Discharge:  2025   Delivering Physician: Bobbi Pritchard       Admission Diagnosis: Pregnancy [Z34.90]   Discharge Diagnosis: Pregnancy at 40w0d - delivered       Procedures: 2025 - Vaginal, Spontaneous      Hospital Course  Patient is a 35 y.o.  who at 40w0d had a vaginal birth.  Her postpartum course was without complications.  On PPD 2, she was ready for discharge.  She had normal lochia and pain managed oral medication and comfort products.     Infant  male fetus weighing 3850 g (8 lb 7.8 oz)  Apgars -  8 @ 1 minute /  9 @ 5 minutes.    Discharge labs  Lab Results   Component Value Date    WBC 11.29 (H) 02/10/2025    HGB 9.8 (L) 2025    HCT 29.6 (L) 2025     02/10/2025       Discharge Medications     Discharge Medications        New Medications        Instructions Start Date   traMADol 50 MG tablet  Commonly known as: ULTRAM   50 mg, Oral, Every 8 Hours PRN             Continue These Medications        Instructions Start Date   Breast Pump misc   1 Device, Not Applicable, As Needed      Easy Feed Electric Breast Pump misc   use as directed.      magnesium oxide 400 MG tablet  Commonly known as: MAG-OX   400 mg, Daily      Prenatal 27-1 27-1 MG tablet tablet   1 tablet, Oral, Daily               External Prenatal Results       Pregnancy Outside Results - Transcribed From Office Records - See Scanned Records For Details       Test Value Date Time    ABO  A  02/10/25 1059    Rh  Negative  02/10/25 1059    Antibody Screen  Negative  02/10/25 1059       Negative  24 1105       Positive  24 1435       See Final Results  24 1435       Negative  24 0934    Varicella IgG  2,220 index 24 1435     Rubella  2.56 index 07/01/24 1435    Hgb  9.8 g/dL 02/13/25 0815       12.9 g/dL 02/10/25 1059       11.9 g/dL 11/21/24 1105       11.9 g/dL 09/23/24 1304       12.7 g/dL 07/01/24 1435    Hct  29.6 % 02/13/25 0815       37.6 % 02/10/25 1059       35.5 % 11/21/24 1105       35.5 % 09/23/24 1304       38.4 % 07/01/24 1435    HgB A1c        1h GTT  97 mg/dL 11/21/24 1105    3h GTT Fasting       3h GTT 1 hour       3h GTT 2 hour       3h GTT 3 hour        Gonorrhea (discrete)  Negative  01/27/25 1000       Negative  07/01/24 1435    Chlamydia (discrete)  Negative  01/27/25 1000       Negative  07/01/24 1435    RPR  Non Reactive  11/21/24 1105       Non Reactive  07/01/24 1435    Syphils cascade: TP-Ab (FTA)  Non-Reactive  02/10/25 1250    TP-Ab  Non-Reactive  02/10/25 1250    TP-Ab (EIA)       TPPA       HBsAg  Negative  07/01/24 1435    Herpes Simplex Virus PCR       Herpes Simplex VIrus Culture       HIV  Non Reactive  07/01/24 1435    Hep C RNA Quant PCR       Hep C Antibody       AFP       NIPT       Cystic Fibrosis (Cooper)  Negative  08/07/24 1608    Cystic Fibroisis        Spinal Muscular atrophy  Negative  08/07/24 1608    Fragile X       Group B Strep  Negative  01/27/25 1000    GBS Susceptibility to Clindamycin       GBS Susceptibility to Erythromycin       Fetal Fibronectin       Genetic Testing, Maternal Blood                 Drug Screening       Test Value Date Time    Urine Drug Screen       Amphetamine Screen  Negative  02/11/25 2211    Barbiturate Screen  Negative  02/11/25 2211    Benzodiazepine Screen  Negative  02/11/25 2211    Methadone Screen  Negative  02/11/25 2211    Phencyclidine Screen  Negative  02/11/25 2211    Opiates Screen  Negative  02/11/25 2211    THC Screen  Negative  02/11/25 2211    Cocaine Screen       Propoxyphene Screen       Buprenorphine Screen  Negative  02/11/25 2211    Methamphetamine Screen       Oxycodone Screen  Negative  02/11/25 2211    Tricyclic Antidepressants Screen   Negative  02/11/25 2211              Legend    ^: Historical                            Discharge Disposition Home or Self Care   Condition on Discharge: good   Follow-up: 2 weeks for postpartum mood check with JALIL Pritchard and then 6 weeks with Dr. Roberto or JALIL Pritchard       Plan for discharge reviewed with Dr. Enriquez.    This note has been signed electronically.    Temitope Tenorio DNP, APRN, JALIL  2/14/2025

## 2025-02-14 NOTE — PROGRESS NOTES
"      NATA Chen  [facility]  [address]  [place] [address]  Office [phone]  Fax [phone]    [facility]  Vaginal Delivery Progress Note    Subjective   Postpartum Day 2: Vaginal Delivery    Feeling sore this morning. She has taken a couple of the Ultram to help with the discomfort as the Motrin was not quite taking care of the pain. Ambulating and voiding without difficulty. Describes bleeding as light. Breastfeeding and reports the infant is latching.      Objective     Vital Signs Range for the last 24 hours  Temperature: Temp:  [97.7 °F (36.5 °C)-98.2 °F (36.8 °C)] 97.7 °F (36.5 °C)   Temp Source: Temp src: Temporal   BP: BP: (118-124)/(70-81) 118/81   Pulse: Heart Rate:  [] 95   Respirations: Resp:  [16-18] 16   SPO2: SpO2:  [98 %-100 %] 98 %   O2 Amount (l/min):     O2 Devices Device (Oxygen Therapy): room air   Weight:       Admit Height:         Physical Exam:  General:  no acute distresss. Cooperative. Normal mood and behavior  Abdomen: soft, without significant tenderness; fundus firm  Extremities: calves nontender, movement without difficulty, trace lower leg and pedal edema    Lab results reviewed:  Yes   Rubella:  No results found for: \"RUBELLAIGGIN\" Nurse Transcribed from prenatal record --  No components found for: \"EXTRUBELQUAL\"  Rh Status:  No results found for: \"RH\"  Immunizations:   Immunization History   Administered Date(s) Administered    Rho (D) Immune Globulin 06/21/2024, 11/21/2024    Tdap 11/15/2021     Lab Results (last 24 hours)       Procedure Component Value Units Date/Time    Hemoglobin & Hematocrit, Blood [[id number]]  (Abnormal) Collected: 02/13/25 0815    Specimen: Blood Updated: 02/13/25 0857     Hemoglobin 9.8 g/dL      Hematocrit 29.6 %             External Prenatal Results       Pregnancy Outside Results - Transcribed From Office Records - See Scanned Records For Details       Test Value Date Time    ABO  A  02/10/25 1059    Rh  Negative  " 02/10/25 1059    Antibody Screen  Negative  02/10/25 1059       Negative  11/21/24 1105       Positive  07/01/24 1435       See Final Results  07/01/24 1435       Negative  06/21/24 0934    Varicella IgG  2,220 index 07/01/24 1435    Rubella  2.56 index 07/01/24 1435    Hgb  9.8 g/dL 02/13/25 0815       12.9 g/dL 02/10/25 1059       11.9 g/dL 11/21/24 1105       11.9 g/dL 09/23/24 1304       12.7 g/dL 07/01/24 1435    Hct  29.6 % 02/13/25 0815       37.6 % 02/10/25 1059       35.5 % 11/21/24 1105       35.5 % 09/23/24 1304       38.4 % 07/01/24 1435    HgB A1c        1h GTT  97 mg/dL 11/21/24 1105    3h GTT Fasting       3h GTT 1 hour       3h GTT 2 hour       3h GTT 3 hour        Gonorrhea (discrete)  Negative  01/27/25 1000       Negative  07/01/24 1435    Chlamydia (discrete)  Negative  01/27/25 1000       Negative  07/01/24 1435    RPR  Non Reactive  11/21/24 1105       Non Reactive  07/01/24 1435    Syphils cascade: TP-Ab (FTA)  Non-Reactive  02/10/25 1250    TP-Ab  Non-Reactive  02/10/25 1250    TP-Ab (EIA)       TPPA       HBsAg  Negative  07/01/24 1435    Herpes Simplex Virus PCR       Herpes Simplex VIrus Culture       HIV  Non Reactive  07/01/24 1435    Hep C RNA Quant PCR       Hep C Antibody       AFP       NIPT       Cystic Fibrosis (Cooper)  Negative  08/07/24 1608    Cystic Fibroisis        Spinal Muscular atrophy  Negative  08/07/24 1608    Fragile X       Group B Strep  Negative  01/27/25 1000    GBS Susceptibility to Clindamycin       GBS Susceptibility to Erythromycin       Fetal Fibronectin       Genetic Testing, Maternal Blood                 Drug Screening       Test Value Date Time    Urine Drug Screen       Amphetamine Screen  Negative  02/11/25 2211    Barbiturate Screen  Negative  02/11/25 2211    Benzodiazepine Screen  Negative  02/11/25 2211    Methadone Screen  Negative  02/11/25 2211    Phencyclidine Screen  Negative  02/11/25 2211    Opiates Screen  Negative  02/11/25 2211    THC  Screen  Negative  02/11/25 2211    Cocaine Screen       Propoxyphene Screen       Buprenorphine Screen  Negative  02/11/25 2211    Methamphetamine Screen       Oxycodone Screen  Negative  02/11/25 2211    Tricyclic Antidepressants Screen  Negative  02/11/25 2211              Legend    ^: Historical                            Assessment & Plan       Pregnancy      Dominique Tee is Day 2  post-partum  Vaginal, Spontaneous  .      Plan:  Continue current postpartum plan of care. Lactation support for follow-up prior to discharge and then as needed outpatient. Discharge home with standard precautions. Return to the clinic in 2 weeks with JALIL Pritchard for a postpartum mood check, 6 weeks for postpartum visit, and as needed with concerns.      Plan for discharge reviewed with Dr. Enriquez.     This note has been signed electronically.    Temitope Tenorio DNP, APRN, JALIL  2/14/2025  07:28 CST

## 2025-02-14 NOTE — PAYOR COMM NOTE
"REF:   [id number]   [facility]  FAX  [phone]      Dominique Ewing (35 y.o. Female)       Date of Birth   1990    Social Security Number       Address   [place] [address]    Home Phone   [phone]    MRN   [id number]       Jainism   Gnosticism    Marital Status                               Admission Date   2/10/25    Admission Type   Elective    Admitting Provider   Facundo De MD    Attending Provider       Department, Room/Bed   [facility] MOTHER BABY 2A, M264/1       Discharge Date   2/14/2025    Discharge Disposition   Home or Self Care    Discharge Destination                                 Attending Provider: (none)   Allergies: Silver, Zoloft [Sertraline Hcl], Allevyn Adhesive [Wound Dressings], Amoxicillin    Isolation: None   Infection: None   Code Status: CPR    Ht: 170.2 cm (67.01\")   Wt: 98 kg (216 lb)    Admission Cmt: None   Principal Problem: Pregnancy [Z34.90]                   Active Insurance as of 2/10/2025       Primary Coverage       Payor Plan Insurance Group Employer/Plan Group    [employer]HEM BLUE CROSS ANTHEM BLUE CROSS BLUE SHIELD PPO [id number]       Payor Plan Address Payor Plan Phone Number Payor Plan Fax Number Effective Dates    [address] [phone]  1/1/2023 - None Entered    [place] [address]         Subscriber Name Subscriber Birth Date Member ID       CLAUDIA EWING 1990 [id number]                     Emergency Contacts        (Rel.) Home Phone Work Phone Mobile Phone    CLAUDIA EWING (Spouse) [phone] -- [phone]                 Discharge Summary        Temitope Tenorio APRN at 02/14/25 0758       Attestation signed by Terrance Enriquez MD at 02/14/25 0870    I have reviewed this documentation and agree.                  [facility]    NATA Chen  [address]  [place] [address]  [phone]      Discharge Summary     " [place] Tee  : 1990  MRN: [id number]  [facility]: [id number]    Date of Admission: 2/10/2025   Date of Discharge:  2025   Delivering Physician: Bobbi Pritchard       Admission Diagnosis: Pregnancy [Z34.90]   Discharge Diagnosis: Pregnancy at 40w0d - delivered       Procedures: 2025 - Vaginal, Spontaneous      Hospital Course  Patient is a 35 y.o.  who at 40w0d had a vaginal birth.  Her postpartum course was without complications.  On PPD 2, she was ready for discharge.  She had normal lochia and pain managed oral medication and comfort products.     Infant  male fetus weighing 3850 g (8 lb 7.8 oz)  Apgars -  8 @ 1 minute /  9 @ 5 minutes.    Discharge labs  Lab Results   Component Value Date    WBC 11.29 (H) 02/10/2025    HGB 9.8 (L) 2025    HCT 29.6 (L) 2025     02/10/2025       Discharge Medications     Discharge Medications        New Medications        Instructions Start Date   traMADol 50 MG tablet  Commonly known as: ULTRAM   50 mg, Oral, Every 8 Hours PRN             Continue These Medications        Instructions Start Date   Breast Pump misc   1 Device, Not Applicable, As Needed      Easy Feed Electric Breast Pump misc   use as directed.      magnesium oxide 400 MG tablet  Commonly known as: MAG-OX   400 mg, Daily      Prenatal 27-1 27-1 MG tablet tablet   1 tablet, Oral, Daily               External Prenatal Results       Pregnancy Outside Results - Transcribed From Office Records - See Scanned Records For Details       Test Value Date Time    ABO  A  02/10/25 1059    Rh  Negative  02/10/25 1059    Antibody Screen  Negative  02/10/25 1059       Negative  24 1105       Positive  24 1435       See Final Results  24 1435       Negative  24 0934    Varicella IgG  2,220 index 24 1435    Rubella  2.56 index 24 1435    Hgb  9.8 g/dL 25 0815       12.9 g/dL 02/10/25 1059       11.9 g/dL 24 1105       11.9 g/dL 24  1304       12.7 g/dL 07/01/24 1435    Hct  29.6 % 02/13/25 0815       37.6 % 02/10/25 1059       35.5 % 11/21/24 1105       35.5 % 09/23/24 1304       38.4 % 07/01/24 1435    HgB A1c        1h GTT  97 mg/dL 11/21/24 1105    3h GTT Fasting       3h GTT 1 hour       3h GTT 2 hour       3h GTT 3 hour        Gonorrhea (discrete)  Negative  01/27/25 1000       Negative  07/01/24 1435    Chlamydia (discrete)  Negative  01/27/25 1000       Negative  07/01/24 1435    RPR  Non Reactive  11/21/24 1105       Non Reactive  07/01/24 1435    Syphils cascade: TP-Ab (FTA)  Non-Reactive  02/10/25 1250    TP-Ab  Non-Reactive  02/10/25 1250    TP-Ab (EIA)       TPPA       HBsAg  Negative  07/01/24 1435    Herpes Simplex Virus PCR       Herpes Simplex VIrus Culture       HIV  Non Reactive  07/01/24 1435    Hep C RNA Quant PCR       Hep C Antibody       AFP       NIPT       Cystic Fibrosis (Cooper)  Negative  08/07/24 1608    Cystic Fibroisis        Spinal Muscular atrophy  Negative  08/07/24 1608    Fragile X       Group B Strep  Negative  01/27/25 1000    GBS Susceptibility to Clindamycin       GBS Susceptibility to Erythromycin       Fetal Fibronectin       Genetic Testing, Maternal Blood                 Drug Screening       Test Value Date Time    Urine Drug Screen       Amphetamine Screen  Negative  02/11/25 2211    Barbiturate Screen  Negative  02/11/25 2211    Benzodiazepine Screen  Negative  02/11/25 2211    Methadone Screen  Negative  02/11/25 2211    Phencyclidine Screen  Negative  02/11/25 2211    Opiates Screen  Negative  02/11/25 2211    THC Screen  Negative  02/11/25 2211    Cocaine Screen       Propoxyphene Screen       Buprenorphine Screen  Negative  02/11/25 2211    Methamphetamine Screen       Oxycodone Screen  Negative  02/11/25 2211    Tricyclic Antidepressants Screen  Negative  02/11/25 2211              Legend    ^: Historical                            Discharge Disposition Home or Self Care   Condition on  Discharge: good   Follow-up: 2 weeks for postpartum mood check with JALIL Pritchard and then 6 weeks with Dr. Jalloh or JALIL Pritchard       Plan for discharge reviewed with Dr. Enriquez.    This note has been signed electronically.    Temitope Tenorio DNP, APRN, CNM  2/14/2025        Electronically signed by Terrance Enriquez MD at 02/14/25 0837       Discharge Order (From admission, onward)       Start     Ordered    02/14/25 0753  Discharge patient  Once        Expected Discharge Date: 02/14/25   Discharge Disposition: Home or Self Care   Physician of Record for Attribution - Please select from Treatment Team: PRATIBHA JALLOH [[id number]]   Review needed by CMO to determine Physician of Record: No      Question Answer Comment   Physician of Record for Attribution - Please select from Treatment Team PRATIBHA JALLOH    Review needed by CMO to determine Physician of Record No        02/14/25 0755    02/10/25 1159  Discharge patient  Once,   Status:  Canceled        Expected Discharge Date: 02/10/25   Expected Discharge Time: Midday   Discharge Disposition: Home or Self Care   Physician of Record for Attribution - Please select from Treatment Team: PRATIBHA JALLOH [[id number]]   Review needed by CMO to determine Physician of Record: No      Question Answer Comment   Physician of Record for Attribution - Please select from Treatment Team PRATIBHA JALLOH    Review needed by CMO to determine Physician of Record No        02/10/25 1158

## 2025-02-15 ENCOUNTER — HOSPITAL ENCOUNTER (OUTPATIENT)
Dept: LACTATION | Facility: HOSPITAL | Age: 35
Discharge: HOME | End: 2025-02-15
Payer: COMMERCIAL

## 2025-02-15 NOTE — LACTATION NOTE
Mother's Name:Dominique  Contact Number: [phone]  G/P:2/1  Breastfeeding Hx:first time breastfeeder, slow breastfeeding in hospital  Significant Medical History:hert murmur, +THC on prenatals, - on admission.   Maternal Breast Assessment: tubular breasts, bilateral areolas and nipples with bruising.  Breasts mildly filling, transitioning milk easily expresses via hand expression.     Infant's Name:Azael  YOB: 2023  Gestational age at Birth:40w0d  Age:3 days  Physician:Fanny                     Reason for Visit:weight check, transfer evaluation          Infant's Birth weight:8-7.8 (3850g)  Previous Weight:7-14.6 (3590g)  Wt Loss:-6.75%    Today's Weight:    7-10.0 (3460g) Wt Loss:-9.35%    Feeding History Since Discharge/Last Lactation Appt.:11 bf in the last 24 hours. Some hourly feeds but parents report infant quickly falls asleep within a few minutes and requires constant stimulation for any activity.     Past 24 Hours Voids/Stools:  +2/3      Color of Stool:black    Pre Weight:7-10.8 (3480g)           Left Breast:    10 mins   7-10.6 (3480g) 0gain                Right Breast:      8 mins  7-11.0 (3485g)+5 ml                   Total Minutes:      18mins       Total Weight Gain:  +5        gms    Average Feeding Amount for Age: 15-30 mls every 2-3 hours    Interventions:infant assessment reveals a very sleepy Azael, also noted to have significantly visible jaundice/orange/tanning to skin. Infant undressed and diaper changed, some fussiness achieved from diaper change but infant calms and becomes sleepy quickly. Waking performed and moved to mother's left breast in cradle hold. Assisted with latch. Infant gapes wide and latches easily. Begins sucking with good effort, occasional swallows observed but infant becomes sleepy quickly. Within 5 mins infant begins to require frequent stimulation for sucks.by 10 mins infant with very weak, non-nutritive sucks. Moved to scale for weight with gain of 0 gm.  Waking performed and moved infant to right breast in football hold. Infant latches easily. Assisted to compress breast during feeding, attempting to keep infant active and engaged. Infant again becomes very sleepy by 8 mins, exhibiting more of non-nutritive suckles. Removed from breast and noted gain of 5 mls. After breastfeeding, assisted mother to pump using hospital grade pump and 21 mm flanges. Pumped for 15 mins and collected 3 mls. 3 mls EBM provided to [facility] via syringe. Parents agreeable to formula supplementing. Similac 360 advance provided to infant via bottle. Demonstrated paced bottle feeding with slow flow nipple with infant in side lying hold. Infant accepted 15 mls paced, appears content after feeding and resumes sleep.     Education:average feeding amounts  Signs/symptoms of jaundice.   Increasing milk supply- encouraging milk transition  Paced bottle feeding  Compression of breasts during feeding to promote milk transition  Education on safe storage of breastmilk and formula    Notified MD/ Orders Received:notified oncall physician Dr. Zarate regarding weight loss, poor transfer and concerns for jaundice in [facility]. Dr. Zarate attempted to place order for bilirubin draw but having difficulty, notified lab and registration of anticipated bilirubin draw at 1040. Continued issues ordering bilirubin. Notified  at 1146, whom was able to remotely place order for serum bilirubin. Again, to expedite lab draw, notified lab and registration. Lab drawn at 1255. Result 15.9. notified Dr. Escobedo at 1331, orders received to instruct parents to bring infant to  office Monday, February 17 at 0900 for evaluation and possible repeat bilirubin.  Attempted calling Leslie at [phone] and [phone] ( called both phone numbers [phone] and [phone], no answer, left message on [phone] requesting call back asa).     1352, Yvan returns call, updated on [facility] bilirubin and order  received from Dr. Escobedo. Lynne voices understanding of plan. Education reiterated regarding elevated bilirubin, instructed to return to ER over weekend IF Azael becomes increasingly lethargic, unable to feed adequate or decreases in wet/stool diapers. Lynne requesting to move lactation follow up to 12pm on Monday, to follow appt with Dr. Escobedo at 0900.        Feeding Plan: continue with breastfeeding attempts, waking Azael every 3 hours or feeding more frequently per his hunger cues.all feedings to be skin to skin, waking Azael well before attempting to latch.  Attempt breastfeeding on each breast for 10 mins, Dominique should then hand Azael over to Lynne and allow Lynne to assist with supplemental feeding via syringe or paced bottle feeding. Bottle feedings should also be performed skin to skin, always offer any available breastmilk FIRST, and supplement with formula to achieve expected total feeding. While Lynne supplements Azael with EBM/formula, Dominique needs to pump for 15-20 mins.    During call back for bilirubin results, lynne reports he fed Azael a bottle of formula while waiting for lab draw at 12pm, he reports Azael was very sleepy with feeding, required apprx 15 mins to take 15 mls of formula. He also reports Dominique was unable to pump or breastfeed during this time. Reassured bottle feeding appropriate, discouraged from a bottle feeding taking longer than 15 mins of any volume (bottle feeding should not take 20-30-40 mins). Encourage Dominique to power pump after next feeding at 3p, ( pump for 15/20 mins, stop&rest for 10 mins, pump for 10 mins, stop&rest for 10 mins, pump for 10 mins) explained power pumping will make up for missing the 12pm session. Lynne asked appropriate questions and voices understanding of plan.     Plan of Care:    Interventions require further assessment with [facility] Lactation    Interventions require further assessment with MD Ta  Appointments:    Lactation:Monday, February 17th at 12pm    Physician:Dr. Escobedo Monday, February 17th at 0900    Signature:Shreya Martines, RN, BSN, CBS    Faxed to:Dr. Escobedo  Date:2/15/2025

## 2025-02-16 ENCOUNTER — MATERNAL SCREENING (OUTPATIENT)
Dept: CALL CENTER | Facility: HOSPITAL | Age: 35
End: 2025-02-16
Payer: COMMERCIAL

## 2025-02-16 NOTE — OUTREACH NOTE
Maternal Screening Survey      Flowsheet Row Responses   Eligibility Eligible   Prep survey completed? Yes   Facility patient discharged from? [facility] - Registered Nurse

## 2025-02-17 ENCOUNTER — HOSPITAL ENCOUNTER (OUTPATIENT)
Dept: LACTATION | Facility: HOSPITAL | Age: 35
Discharge: HOME | End: 2025-02-17
Payer: COMMERCIAL

## 2025-02-17 NOTE — LACTATION NOTE
Mother's Name:  Dominique Tee  Contact Number:   [phone]  G/P:    2/1  Breastfeeding Hx:  first time breastfeeder, slow breastfeeding in hospital  Significant Medical History:hert murmur, +THC on prenatals, - on admission.   Maternal Breast Assessment: tubular breasts, bilateral areolas and nipples with bruising.  Breasts full,  milk easily expresses via hand expression. Small plugged duct noted pea-sized LUOQ.  Support Person:  Erlin Tee,      Infant's Name:  Azael Tee  YOB: 2023  Gestational age at Birth: 40w0d  Age:    5 days  Physician:   Bennett Escobedo MD                     Reason for Visit:  weight check, transfer evaluation follow up                     Infant's Birth weight:  8-7.8 (3850g)  Previous Weight:  7-14.6 (3590g)  Wt Loss:-6.75%  Last Weight   7-10.0 3460g   Wt Loss:  9.4%    Today's Weight:       8-0.4 3642g   Wt Loss:  5.4%  MUCH IMPROVED     Feeding History Since Discharge/Last Lactation Appt.: Pt using silverettes and has bought a nipple shield due to tenderness with latching.  Exclusive bfing up until 2 days ago. Two days ago pt began bfing short duration followed by paced bottle feeding afterward with full feed. Pt collecting 10-30 mls per session with hand pump. Pt has now obtained 20 mm flange insert for [employer] and has a 20mm for Mom[employer]zy. Today, she plans to use electric pump. Parents have been giving 30-45 mls in a bottle per feeding, combo of EBM/formula as needed. Dad gives bottle with PACED method.      Past 24 Hours Voids/Stools:   4 / 4     Color of Stool: yellow/seedy     Time at Breast         Left Breast  35 mins +8 mls  Left 2nd time   5 mins  +2 mls   (With nipple shield)               Right Breast:    15 mins +12 mls (no shield)                     Total Minutes:   55   mins       Total Weight Gain:     22     gms  (3/4 of 1 oz)     Average Feeding Amount for Age: 45-60 mls or 1 1/2 - 2 oz, every 2-3 hours or 8-12 times in 24 hours.    "  Interventions:  Observed pt shallow latching, but with assistance from ALEX Abraham RN pt able to latch deeply and appropriately. Pt reports bfing with several \"pinches\" occurring during a feed. Several relatches done to attain deep pain-free latch.  Per verbal prompts Dad provided waking techniques. Encouraged breast compressions for better transfer. Mom complying with same. Haakaa given, applied to breast during feed. Cross cradle / football holds practiced with deep latching. Infant latched well on R with no nipple shield.   Nipple Shield: Taught use of same as mother likely to use at home per her report. Educated on indications for use and in Azael's case use of same is indicated as infant having difficulty staying latched and obtaining relaxed feeding rhythm. Noted that using shield due to nipple pain is contra-indicated. Rather, learning to latch deeply, and remedying shallow latch is preferred over shiled use and shallow latching.      Education: Handouts discussion on :Positioning, Power Pumping, Galactogogues, Increasing Milk Supply., Haakaa given/ applied , conseving       Feeding Plan:   BF at first cues going no longer than 3 hours between feeds.  Get very deep latch, covering almost all areola for every feed.  Offer both breasts 15 mins. NO PAUSING TIMER.  Use Haakaa on opposite breast every time you bf.   After bfing, give appropriate amount in bottle with PACED method.    It's very important to use PACED BOTTLE FEEDING METHOD for every feed.  Give 45-60 mls in a bottle.   Nighttime:  Omit time at breast. Give bottle while pumping, and everyone go back to bed.   Hold skin to skin often.      Plan of Care:   Interventions require further assessment with [facility] Lactation   Interventions require further assessment with MD     Future Appointments:     Lactation: Friday, 2/21/25, 1 pm [facility] Lactation    Physician:Dr. Escobedo SAW TODAY   Signature: Fiona Brewer, IBCLC   Faxed to: [facility]" Fanny with pt's verbal permission  Date:  2/17/25

## 2025-02-20 NOTE — PROGRESS NOTES
Subjective   No chief complaint on file.    Dominique Tee is a 35 y.o. year old  presenting to be seen for her postpartum visit.  She had a vaginal delivery.   Prenatal course was  complicated by gestational hypertension.    Since delivery she has not been sexually active.  She does not have concerns about post-partum blues/depression.   She is both breastfeeding and pumping and bottle feeding with pumped milk.    The following portions of the patient's history were reviewed and updated as appropriate:problem list, current medications, and allergies    Social History     Socioeconomic History    Marital status:    Tobacco Use    Smoking status: Former     Current packs/day: 1.00     Average packs/day: 1 pack/day for 6.1 years (6.1 ttl pk-yrs)     Types: Cigarettes     Start date: 2019    Smokeless tobacco: Never   Vaping Use    Vaping status: Former   Substance and Sexual Activity    Alcohol use: Yes     Comment: occasional     Drug use: Yes     Frequency: 2.0 times per week     Types: Marijuana    Sexual activity: Yes     Partners: Male     Birth control/protection: None     Review of Systems   Constitutional:  Positive for fatigue.   Respiratory: Negative.     Cardiovascular: Negative.    Gastrointestinal:  Positive for constipation (mild). Negative for diarrhea.   Endocrine: Negative.    Genitourinary:  Positive for difficulty urinating (sometimes difficulty initiating flow; sometimes no problem), vaginal bleeding (like a light period) and vaginal pain (sore). Negative for decreased urine volume, dysuria, frequency, hematuria, pelvic pain, urgency and vaginal discharge.   Musculoskeletal: Negative.    Skin: Negative.    Neurological: Negative.    Psychiatric/Behavioral: Negative.          Occasional crying         Objective   LMP 2024 (Exact Date)     General:  well developed; well nourished  no acute distress   Abdomen: Not performed.   Pelvis: Not performed.          Diagnoses and  all orders for this visit:    1. Postpartum care following vaginal delivery (Primary)  --Continue resting when possible. Napping when the baby does can help make up for some of the sleep you're missing at night.  --Breastfeed on demand rather than watching the clock. Watch for signs of engorgement, clogs, heat, redness, pain, skin breakdown. Call for concerns.   --If you begin having trouble with anxiety, feeling overwhelmed, depressed, trouble caring for yourself or your baby-- please make an appointment to be seen. Medication or a referral for counseling are both options if needed. If struggling with thoughts of hurting yourself or others, please go to the nearest ER for help.   --Allow others to help with household duties as much as possible. Cooking, cleaning, and caring for older children can be done by others if they are available and willing. Healing and caring for your infant are your primary responsibilities in the early postpartum period.         Follow up in 1 month or sooner as needed.    This note was electronically signed.  Bobbi Pritchard CNM APRN  February 20, 2025

## 2025-02-21 ENCOUNTER — HOSPITAL ENCOUNTER (OUTPATIENT)
Dept: LACTATION | Facility: HOSPITAL | Age: 35
Discharge: HOME | End: 2025-02-21
Payer: COMMERCIAL

## 2025-02-21 ENCOUNTER — POSTPARTUM VISIT (OUTPATIENT)
Age: 35
End: 2025-02-21
Payer: COMMERCIAL

## 2025-02-21 VITALS
HEIGHT: 67 IN | SYSTOLIC BLOOD PRESSURE: 116 MMHG | WEIGHT: 194 LBS | BODY MASS INDEX: 30.45 KG/M2 | DIASTOLIC BLOOD PRESSURE: 80 MMHG

## 2025-02-21 NOTE — LACTATION NOTE
Mother's Name:                       Dominique Tee  Contact Number:                     [phone]  G/P:                                         2/1  Breastfeeding Hx:                  first time breastfeeder, slow breastfeeding in hospital  Significant Medical History: heart murmur, +THC on prenatals, - on admission.     Maternal Breast Assessment: Full, no engorgement, no nipple trauma, mom reports normal breast tenderness    Support Person:                      Erlin Tee,      Infant's Name:                       Azael Tee  YOB: 2023  Gestational age at Birth: 40w0d  Age:                                        9 days  Physician:                               Bennett Escobedo MD                     Reason for Visit:                     weight check, transfer evaluation follow up                     Infant's Birth weight:                8-7.8 (3850g)  Previous Weight:                     8-0.4    3642g                          Wt Loss:  5.4%     Today's Weight:             8-11.2 (3946g)          Above birth weight- great job!!     Feeding History Since Discharge/Last Lactation Appt.: Breast feeding infant every 3 hours and then offers 2 oz of breastmilk in a bottle. Having to wake infant to feed every 3 hours. Has tried to use haaka but baby kicks it off and hurts moms nipples. Mom has been pumping after every nursing session and yields 10-30 ml each time. Has power pumped a couple times to try to increase supply. Has been using nipple shield. Has attempted to latch without shield a few times but states that infant gets very fussy.         Past 24 Hours Voids/Stools:    lots of both    Color of Stool: yellow/seedy     Time at Breast         Left Breast:        10 mins  +24 ml               Right Breast:      11 mins  +12 ml    Right breast 2nd time: 11 mins  + 4 ml                     Total Minutes:    32      mins       Total Weight Gain:       40 grams           Average Feeding Amount for Age: 2-3 oz or 60-90 ml        Interventions: pt able to independently position and latch infant in cross cradle. Encouraged pt and  to keep infant awake and drinking at breast.        Education: Keeping baby awake and drinking        Feeding Plan:   Try to use the [place] suction  (fits in bra) similar concept of alize   Keep Azael awake and drinking for 15 mins on first breast then wake and offer second breast. Attempt to feed on second breast for at least 10 mins.   Can continue to offer 1 oz after breastfeeding for every other feed   Attempt to latch without nipple shield  Compress breast to deliver more milk as baby drinks.         Plan of Care:   Interventions require further assessment with [facility] Lactation   Interventions require further assessment with MD       Future Appointments:     Lactation:        02/27/2025 at  12:00    Physician: Dr. Escobedo   Signature:  Susanna Holt RN   Date:    02/21/2025

## 2025-02-23 ENCOUNTER — MATERNAL SCREENING (OUTPATIENT)
Dept: CALL CENTER | Facility: HOSPITAL | Age: 35
End: 2025-02-23
Payer: COMMERCIAL

## 2025-02-23 NOTE — OUTREACH NOTE
Maternal Screening Survey      Flowsheet Row Responses   Facility patient discharged from? [facility]   Attempt successful? No  [left msg on vm]   Unsuccessful attempts Attempt 1              [facility] - Registered Nurse          
No apparent complications or complaints regarding anesthesia care at this time/All questions were answered

## 2025-02-23 NOTE — OUTREACH NOTE
Maternal Screening Survey      Flowsheet Row Responses   Facility patient discharged from? [facility]   Attempt successful? No   Unsuccessful attempts Attempt 2              [facility] - Registered Nurse

## 2025-02-24 ENCOUNTER — MATERNAL SCREENING (OUTPATIENT)
Dept: CALL CENTER | Facility: HOSPITAL | Age: 35
End: 2025-02-24
Payer: COMMERCIAL

## 2025-02-24 NOTE — OUTREACH NOTE
Maternal Screening Survey      Flowsheet Row Responses   Facility patient discharged from? [place]   Attempt successful? No   Unsuccessful attempts Attempt 3   Revoke Unable to reach              Alex W - Registered Nurse

## 2025-02-27 ENCOUNTER — HOSPITAL ENCOUNTER (OUTPATIENT)
Dept: LACTATION | Facility: HOSPITAL | Age: 35
Discharge: HOME | End: 2025-02-27
Payer: COMMERCIAL

## 2025-02-27 NOTE — LACTATION NOTE
"Mother's Name:                       Dominique Tee  Contact Number:                     [phone]  G/P:                                         2/1  Breastfeeding Hx:                  first time breastfeeder, slow breastfeeding in hospital  Significant Medical History:heart murmur, +THC on prenatals, - on admission.    Maternal Breast Assessment:  Milk easily expressed, R with no trauma / tenderness; L with faint redness at base of areola.   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     Infant's Name:                       Azael Tee  YOB: 2023  Gestational age at Birth: 40w0d  Age:                                        15 days  Physician:                               Bennett Escobedo MD                     Reason for Visit:                     weight check, transfer evaluation follow up                     Infant's Birth weight:                8-7.8 (3850g)  Previous Weight:                     8-0.4    3642g                          Wt Loss:  5.4%   Last Weight    8-11.2     Today's Weight:            8-11.3 3954g  (No gain in 6 days.)            Feeding History Since Discharge/Last Lactation Appt.: The last 24 hours mom desired to exclusively bf. This was done. Azael \"used me as a pacifer\" per mother for most feeds. Prior to that, parents gave a supplement of 1 oz after most feeds. Pt collects 1 oz per pumping session when infant was mainly bottle-feeding. Pt collected only 10-15 mls AFTER bfing yesterday. Mom has done a modified Power Pump almost daily.       Past 24 Hours Voids/Stools: 6-8 / 3-4   Color of Stool: yellow/seedy     Time at Breast         Right Breast: 20 mins 8-11.5 3954          +6 mls  Left Breast 16 mins 8-12.3 3978g  +30 mls                     Total Minutes:      36  mins       Total Weight Gain:   36     grams / mls         Average Feeding " "Amount for Age: 2-3 oz or 60-90 ml every 2-3 hours or 8-12 times in 24 hours. Zaael may have one 4-hour span between feeds ONCE at night.      Interventions: Pt feeding in cross cradle hold. Dominique allowing latch to \"stretch\" with infant comfort sucking only. Mom aware of this, but unsure how to correct. Infant's cheeks not pressed into breast tissue. Education on this provided. Infant with comfort sucks only. When placed in football hold, infant had more consistent and some audible swallows. Waking prompts at breast used throughout. Encouraged parents to listen for swallows and watch for mid-chin drops slowly toward chest.       Education: KEEPING deep latch at breast, using nipple shield, comfort sucks vs swallows at breast    Feeding Plan:   BF at first cues going no longer than 3 hours between feeds, with the one 4-hr span at night if he is asleep.  Right after bfing, give 1-2 oz EBM/formula with paced side lying / sitting up bottle feeding method with bottle horizontal to floor.  Pump for 20 mins, right after bfing.   Power Pump as desired; 1-2 times per week if possible.   At night, forego bfing. Give bottle 2-3 oz per bottle. Pump. Go back to bed.       Plan of Care:   Interventions require further assessment with [facility] Lactation   Interventions require further assessment with MD        Future Appointments:     Lactation:        3/5/25, Wed. 11 am    Physician:        Dr. Escobedo   Signature:        Fiona Brewer, IBCLC, Santa Brandt RN   Date:                02/27/2025  "

## 2025-03-05 ENCOUNTER — HOSPITAL ENCOUNTER (OUTPATIENT)
Dept: LACTATION | Facility: HOSPITAL | Age: 35
Discharge: HOME | End: 2025-03-05
Payer: COMMERCIAL

## 2025-03-05 NOTE — LACTATION NOTE
Mother's Name: Dominique Tee  Contact Number: [phone]  G/P: 2/1  Breastfeeding Hx: first time breastfeeder, slow breastfeeding in the hospital, no gain in 6 days at last lactation appointment  Significant Medical History: Heart murmur, + THC on prenatals, - on admission.   Maternal Breast Assessment: milk easily expressed, bilateral tubular breasts. Bilateral nipples sore, Dominique states that she has decreased the suction on her pump and that this has helped.      Infant's Name: Brayan Tee  YOB: 2023  Gestational age at Birth: 40w0d  Age: 3 weeks old   Physician: Bennett Escobedo MD                     Reason for Visit: weight check, transfer evaluation           Infant's Birth weight: 8-7.8 (3850 g)  Previous Weight: 8-11.3 (3954 g)    Today's Weight:    9-5.2 (4230 g)  PAST BIRTH WEIGHT!!    Feeding History Since Discharge/Last Lactation Appt.:   Breastfeeding every 3 hours during the day and following breastfeeding with 40-45 ml of EBM or Formula if not enough EBM. Dominique pumps after breastfeeding attempts and yields 10-20 ml. At night Azael receives 80 ml in a bottle at night of EBM or Formula and Dominique pumps only and is yielding 20-60 ml at a time. Azael was last given 15 ml 1 hour prior to appointment due to fussiness.     Past 24 Hours Voids/Stools:    lots of voids/ at least 3 stools    Color of Stool: yellow/seedy              Left Breast:       12 min  9-7.1 (4286 g)  +34 mls            Right Breast:     10 min 9-6.0 (4252 g)  +22 mls                     Total Minutes:     22  min      Total Weight Gain:     56     gms/mls or just shy of 2 oz!     Average Feeding Amount for Age: 2-3 oz or 60-90 mls every 2-3 hours or 8-12 times in 24 hours.     Interventions: oDminique latched well independently and without assistance on the right breast in cross cradle hold until azael became fussy and was re-weighed. Azael was then latched onto the left breast in a football hold with nipple  "shield in place. Assisted Dominique with repositioning Azael more turned in to her and pulling Azael closer in. After several sucks and swallows Azael became fussy and pulled off and it was noted that his tongue had a scant amount of blood on the tip. Infant noted to have 3 sharp fingernails including thumbnail. Nipple shield without any sharpness and no nipple trauma was noted. Infant was calmed and placed skin to skin. After calming, infant was placed back to the left breast in a cradle hold with nipple shield in place. Infant fed well with sustained alertness with sucks and audible swallows for approximately 5 mins. Azael then fell asleep at the breast and demonstrated shallow \"comfort sucks\". Latch was broken with finger and following weight, Azael was fed 1 oz of formula using paced feeding method.     Education: Average feeding amounts. Follow Azael's hunger cues and feed on demand.     Notified MD/ Orders Received: n/a    Feeding Plan: Continue to breastfeed every 2-3 hours or on demand with hunger cues. Give additional minimum of 1 oz, increase by 1/2 oz at a time of EBM or Formula following breastfeeding. Continue pumping after breastfeeding during the day if desired. At night put Azael to breast as desired or keep continue routine. Azael can go one 4 hour span at night. Make sure to pump for 20 mins if replacing feeding. Store breast milk in 2 oz and 4 oz increments, always prioritize breast milk and do not mix breast milk and formula together.        Future Appointments:    Lactation: Wednesday 3/12/2025 @ 11 am [facility]     Physician: Dr. Fanny MD  Faxed to [facility] with previous consent     Signature: CUCA Bustamante  Date: 3/5/2025   "

## 2025-03-12 ENCOUNTER — HOSPITAL ENCOUNTER (OUTPATIENT)
Dept: LACTATION | Facility: HOSPITAL | Age: 35
Discharge: HOME | End: 2025-03-12
Payer: COMMERCIAL

## 2025-03-12 NOTE — LACTATION NOTE
Pt canceled OP Lactation visit farhat dye, 3/11/25. She is exclusively pumping.    Pt felt this was best option for her as exhaustion with 3-step feeds was overwhelming. She voiced that she does place ifnant at breast at times for comfort. Praised this educating pt on other advantages of at-breast time for comfort. The chance that pathogens can be imparted to her from baby, thereby creating antibodies that will be present in pumped milk (as well as her own antibodies), jaw strengthening for correct teeth placement when they erupt, brain development, and bonding. Pt happy to conintue with this feeding plan. She has chart outlining proper feeding amounts per bottle per age. Also noted to Dominique, that if actual efficient at-breast feeding is not as much of a goal anymore, that parents do not have to be strictly diligent on horizontal bottle placement while feeding; paced. Pt voiced understanding. Also encouraged pt to call if has problems with supply or other issues. Pt agreeable to same.

## 2025-03-14 ENCOUNTER — POSTPARTUM VISIT (OUTPATIENT)
Age: 35
End: 2025-03-14
Payer: COMMERCIAL

## 2025-03-14 VITALS
DIASTOLIC BLOOD PRESSURE: 74 MMHG | WEIGHT: 188 LBS | HEIGHT: 67 IN | SYSTOLIC BLOOD PRESSURE: 116 MMHG | BODY MASS INDEX: 29.51 KG/M2

## 2025-03-14 DIAGNOSIS — R30.0 DYSURIA: Primary | ICD-10-CM

## 2025-03-14 LAB
BILIRUB BLD-MCNC: ABNORMAL MG/DL
CLARITY, POC: ABNORMAL
COLOR UR: ABNORMAL
GLUCOSE UR STRIP-MCNC: NEGATIVE MG/DL
KETONES UR QL: ABNORMAL
LEUKOCYTE EST, POC: ABNORMAL
NITRITE UR-MCNC: NEGATIVE MG/ML
PH UR: 5 [PH] (ref 5–8)
PROT UR STRIP-MCNC: ABNORMAL MG/DL
RBC # UR STRIP: ABNORMAL /UL
SP GR UR: 1.02 (ref 1–1.03)
UROBILINOGEN UR QL: NORMAL

## 2025-03-14 NOTE — PROGRESS NOTES
"Subjective   Chief Complaint   Patient presents with    Postpartum Care     4wk post-vaginal delivery experiencing UTI symptoms starting approx 9 days ago: pain during urination, urgency sensation, and pain when pressing on bladder. No other concerns at this time     Dominique Tee is a 35 y.o. year old  presenting to be seen for her postpartum visit.  She had a vaginal delivery.   Prenatal course was  complicated by gestational hypertension.    She does not have concerns about post-partum blues/depression.   She is  breastfeeding .    The following portions of the patient's history were reviewed and updated as appropriate:problem list, current medications, and allergies    Review of Systems   Constitutional:  Negative for chills, diaphoresis and fever.   Gastrointestinal:  Negative for nausea and vomiting.   Genitourinary:  Positive for dysuria, pelvic pain (when pressing over bladder), urgency and vaginal bleeding (lochia, moderate flow). Negative for difficulty urinating, flank pain, frequency and vaginal pain.   Musculoskeletal:  Negative for back pain.         Objective   /74   Ht 170.2 cm (67\")   Wt 85.3 kg (188 lb)   Breastfeeding Yes   BMI 29.44 kg/m²     General:  well developed; well nourished  no acute distress  No pallor   Abdomen: Not performed.  No CVA tenderness   Pelvis: Not performed.          Diagnoses and all orders for this visit:    1. Dysuria (Primary)  Urine was very contaminated by lochia, so the dipstick was not an accurate way of checking for UTI. Her symptoms remain mild at this time with no fever, back pain, or other reason to believe this is spreading to her kidneys. Discussed that there was the option of doing a straight cath UA which would likely be very uncomfortable but more accurate, or sending the urine for a culture, which would take a few days for results. She felt that she was not in enough pain to desire a cath UA. We discussed that she could do measures " such as drinking cranberry juice in the meantime to try to flush her urinary system as well as using OTC ibuprofen/tylenol for discomfort. To call us if symptoms worsen before we have results back.   -     POC Urinalysis Dipstick  -     Urine Culture - , Urine, Clean Catch    Follow up as needed.    This note was electronically signed.  JALIL Haynes  March 14, 2025

## 2025-03-17 ENCOUNTER — TELEPHONE (OUTPATIENT)
Dept: LACTATION | Facility: HOSPITAL | Age: 35
End: 2025-03-17
Payer: COMMERCIAL

## 2025-03-17 NOTE — TELEPHONE ENCOUNTER
Left pt msg regarding bfing status. Offered phone consult or another OP appointment if needed. Support provided as pt's last visit included issues regarding low milk supply.  Gave number for dept and invited pt to [employer] if she is still bfing or pumping.

## 2025-03-18 LAB
BACTERIA UR CULT: ABNORMAL
OTHER ANTIBIOTIC SUSC ISLT: ABNORMAL

## 2025-03-25 ENCOUNTER — POSTPARTUM VISIT (OUTPATIENT)
Age: 35
End: 2025-03-25
Payer: COMMERCIAL

## 2025-03-25 VITALS
DIASTOLIC BLOOD PRESSURE: 80 MMHG | WEIGHT: 188.3 LBS | HEIGHT: 67 IN | BODY MASS INDEX: 29.55 KG/M2 | SYSTOLIC BLOOD PRESSURE: 128 MMHG

## 2025-03-25 PROCEDURE — 0503F POSTPARTUM CARE VISIT: CPT | Performed by: OBSTETRICS & GYNECOLOGY

## 2025-03-25 NOTE — PROGRESS NOTES
"Chief Complaint  Postpartum Care (Pt here for 6 wk PP visit, vaginal delivery 2025, male, currently pumping and supplementing with formula,  PPD screening  6, last pap 2024, normal, negative co testing. Pt stated started period this am, denies any problems today, will use condoms for birth control.)    Subjective        Dominique Tee presents to [facility] for postpartum visit. Pt doing well. Pumping with supplementing. Denies pp depression. Last pap smear 2024 WNL with negative HPV.  Started menses this am. Does not want contraception. Plans to use condoms.   History of Present Illness    Objective   Vital Signs:  /80   Ht 170.2 cm (67.01\")   Wt 85.4 kg (188 lb 4.8 oz)   BMI 29.48 kg/m²   Estimated body mass index is 29.48 kg/m² as calculated from the following:    Height as of this encounter: 170.2 cm (67.01\").    Weight as of this encounter: 85.4 kg (188 lb 4.8 oz).    BMI is >= 25 and <30. (Overweight) The following options were offered after discussion;: exercise counseling/recommendations and nutrition counseling/recommendations      Physical Exam   deferred    Result Review :                Assessment and Plan   Diagnoses and all orders for this visit:    1. Postpartum care following vaginal delivery (Primary)  Postpartum s/p , doing well. RTC yearly. Pap smear up to date.  Declines contraception.            Follow Up   Return in about 1 year (around 3/25/2026).  Patient was given instructions and counseling regarding her condition or for health maintenance advice. Please see specific information pulled into the AVS if appropriate.             "